# Patient Record
Sex: FEMALE | Race: WHITE | NOT HISPANIC OR LATINO | Employment: FULL TIME | URBAN - NONMETROPOLITAN AREA
[De-identification: names, ages, dates, MRNs, and addresses within clinical notes are randomized per-mention and may not be internally consistent; named-entity substitution may affect disease eponyms.]

---

## 2017-01-10 ENCOUNTER — OUTSIDE FACILITY SERVICE (OUTPATIENT)
Dept: GASTROENTEROLOGY | Facility: CLINIC | Age: 52
End: 2017-01-10

## 2017-01-10 ENCOUNTER — HOSPITAL ENCOUNTER (OUTPATIENT)
Dept: OTHER | Facility: HOSPITAL | Age: 52
Discharge: HOME | End: 2017-01-10
Attending: INTERNAL MEDICINE

## 2017-01-10 LAB — HCG UR QL: NEGATIVE

## 2017-01-10 PROCEDURE — 43249 ESOPH EGD DILATION <30 MM: CPT | Performed by: INTERNAL MEDICINE

## 2017-01-10 PROCEDURE — 43239 EGD BIOPSY SINGLE/MULTIPLE: CPT | Performed by: INTERNAL MEDICINE

## 2017-01-10 RX ORDER — RIZATRIPTAN BENZOATE 10 MG/1
TABLET ORAL
Qty: 3 TABLET | Refills: 0 | Status: SHIPPED | OUTPATIENT
Start: 2017-01-10 | End: 2017-02-09 | Stop reason: SDUPTHER

## 2017-01-11 NOTE — OP NOTE
[address] [address]     [phone]         OPERATIVE REPORT     [id number]         Signed        PATIENT NAME:  ANGIE PROCTOR MRN:  [id number]    [facility]  1965 [id number]    ATTENDING:  JUAN HUMPHREY MD ADMIT DATE:      PRIMARY CARE:  JAIRON MIXON MD LOCATION:  [facility]        CC:  JAIRON MIXON MD; JUAN HUMPHREY MD                 Operative Note    PROCEDURE:  Upper Endoscopy with serial dilation of the distal esophagus using 15-16.5-18 mm CRE   balloon to 18 mm, 4 cold biopsy polypectomies and biopsies.         DATE OF PROCEDURE: January 10, 2017.        REFERRING PROVIDER:  Dr. Simpson         INSTRUMENT:  Olympus GIF-H180J video endoscope.          INDICATIONS OF THE PROCEDURE: This is a 51-year-old white female with history of recurrent reflux,   intermittent dysphagia, atypical chest pains as well as epigastric abdominal pain.  Currently   undergoing upper endoscopy for further evaluation.            BIOPSIES: Second portion of duodenum.  Gastric antrum and greater curvature.  4 cold biopsy   polypectomies were performed at the body of the stomach and adjacent fundus.  Biopsies were   obtained from the polypoid area at cardia.  Biopsies were obtained from mid and distal esophagus.           MEDICATIONS:  MAC.         PHOTOGRAPHS:  Photographs were included in the medical records.         CONSENT/PREPROCEDURE EVALUATION:  Risks, benefits, alternatives and options of the procedure   including risks of anesthesia/sedation were discussed and informed consent was obtained prior to   the procedure. History and physical examination were performed and nothing precluded the test.         REPORT:  The patient was placed in left lateral decubitus position. Once under the influence of IV   sedation, the instrument was inserted into the mouth and esophagus was intubated under direct   vision without difficulty.         Esophagus:  Z line was noted to be around  40  cm.    A small sliding hiatal hernia less than 3 cm   was noted.  Erythematous distal esophagitis was seen.  Distal esophageal stenosis was noted.    Tortuosity and tertiary contractions of esophageal body were noted.  Subtle concentric rings were   seen within the mid and distal esophagus.  Biopsies were obtained.  No Nunez's esophagus was seen.         Stomach:  Antrum:  Erythematous gastritis.  Angulus, lesser and greater curves: Normal.  Retroflex   examination: Sliding hiatal hernia.  Cardia and fundus:  Normal.         Body of the stomach: Erythematous gastritis.  2-3 mm sessile gastric polyps were noted.  4 were   removed with cold biopsy forceps from the body of the stomach and adjacent fundus.  Good   distensibility of the stomach was achieved no giant folds were noted.   Biopsies were obtained from   the gastric antrum, angularis and body of the stomach.        Pylorus and pyloric channel:  normal.         Duodenum:  Bulb: Normal.  Second portion: normal.  No scalloping was seen in the second portion of   duodenum.  Biopsies were obtained from the second portion of duodenum.        Intervention:  Distal esophagus was dilated using 15-16.5-18 mm CRE balloon to 18 mm under vision   without difficulty.  Some blood was noted no active bleeding was seen.           The upper GI tract was decompressed and the scope was pulled out of the patient. The patient   tolerated the procedure well.         DIAGNOSES:       1.  Erythematous distal esophagitis.    2.  Distal esophageal stenosis.  Status post serial dilation to 18 mm.    3.  Subtle concentric rings within the mid and distal esophagus.    4.  A small sliding hiatal hernia less than 3 cm.    5.  Significant tortuosity and tertiary contractions of esophageal body.    6.  Small sessile gastric polyps.        RECOMMENDATIONS:    1.  Dietary instructions.    2.  Pantoprazole 40 mg 1 p.o. q.a.m. 1/2 hour before breakfast.    3.  Follow biopsies.    4.  Follow up  in office.         Thank you very much for letting me participate in the care of this patient. Please do not hesitate   to call me if you have any questions.              DICTATED BY:      JUAN HUMPHREY MD    DICTATED DATE/TIME:      01/10/17 1121    TRANSCRIBED DATE/TIME:      01/10/17 1121        SIGNED:          JUAN HUMPHREY MD       SIGNED DATE/TIME:      01/10/17 2015

## 2017-02-09 RX ORDER — RIZATRIPTAN BENZOATE 10 MG/1
TABLET ORAL
Qty: 3 TABLET | Refills: 0 | Status: SHIPPED | OUTPATIENT
Start: 2017-02-09 | End: 2020-05-07

## 2017-04-24 ENCOUNTER — TRANSCRIBE ORDERS (OUTPATIENT)
Dept: MAMMOGRAPHY | Facility: HOSPITAL | Age: 52
End: 2017-04-24

## 2017-04-24 DIAGNOSIS — Z12.31 VISIT FOR SCREENING MAMMOGRAM: Primary | ICD-10-CM

## 2017-07-18 ENCOUNTER — APPOINTMENT (OUTPATIENT)
Dept: MAMMOGRAPHY | Facility: HOSPITAL | Age: 52
End: 2017-07-18

## 2017-07-25 ENCOUNTER — HOSPITAL ENCOUNTER (OUTPATIENT)
Dept: MAMMOGRAPHY | Facility: HOSPITAL | Age: 52
Discharge: HOME | End: 2017-07-25
Admitting: OBSTETRICS & GYNECOLOGY

## 2017-07-25 DIAGNOSIS — Z12.31 VISIT FOR SCREENING MAMMOGRAM: ICD-10-CM

## 2017-07-25 PROCEDURE — 77063 BREAST TOMOSYNTHESIS BI: CPT | Performed by: RADIOLOGY

## 2017-07-25 PROCEDURE — 77067 SCR MAMMO BI INCL CAD: CPT | Performed by: RADIOLOGY

## 2017-07-25 PROCEDURE — G0202 SCR MAMMO BI INCL CAD: HCPCS

## 2017-07-25 PROCEDURE — 77063 BREAST TOMOSYNTHESIS BI: CPT

## 2018-07-26 ENCOUNTER — TRANSCRIBE ORDERS (OUTPATIENT)
Dept: ADMINISTRATIVE | Facility: HOSPITAL | Age: 53
End: 2018-07-26

## 2018-07-26 DIAGNOSIS — Z12.31 VISIT FOR SCREENING MAMMOGRAM: Primary | ICD-10-CM

## 2018-08-14 ENCOUNTER — HOSPITAL ENCOUNTER (OUTPATIENT)
Dept: MAMMOGRAPHY | Facility: HOSPITAL | Age: 53
Discharge: HOME | End: 2018-08-14
Admitting: OBSTETRICS & GYNECOLOGY

## 2018-08-14 DIAGNOSIS — Z12.31 VISIT FOR SCREENING MAMMOGRAM: ICD-10-CM

## 2018-08-14 PROCEDURE — 77063 BREAST TOMOSYNTHESIS BI: CPT | Performed by: RADIOLOGY

## 2018-08-14 PROCEDURE — 77067 SCR MAMMO BI INCL CAD: CPT | Performed by: RADIOLOGY

## 2018-08-14 PROCEDURE — 77067 SCR MAMMO BI INCL CAD: CPT

## 2018-08-14 PROCEDURE — 77063 BREAST TOMOSYNTHESIS BI: CPT

## 2018-09-24 DIAGNOSIS — Z12.11 SCREENING FOR COLON CANCER: Primary | ICD-10-CM

## 2018-09-26 DIAGNOSIS — Z12.11 SCREENING FOR COLON CANCER: ICD-10-CM

## 2018-10-02 ENCOUNTER — OUTSIDE FACILITY SERVICE (OUTPATIENT)
Dept: GASTROENTEROLOGY | Facility: CLINIC | Age: 53
End: 2018-10-02

## 2018-10-02 PROCEDURE — 45388 COLONOSCOPY W/ABLATION: CPT | Performed by: INTERNAL MEDICINE

## 2018-11-20 ENCOUNTER — OUTSIDE FACILITY SERVICE (OUTPATIENT)
Dept: GASTROENTEROLOGY | Facility: CLINIC | Age: 53
End: 2018-11-20

## 2018-11-20 ENCOUNTER — LAB REQUISITION (OUTPATIENT)
Dept: LAB | Facility: HOSPITAL | Age: 53
End: 2018-11-20

## 2018-11-20 DIAGNOSIS — K21.00 GASTRO-ESOPHAGEAL REFLUX DISEASE WITH ESOPHAGITIS: ICD-10-CM

## 2018-11-20 PROCEDURE — 43239 EGD BIOPSY SINGLE/MULTIPLE: CPT | Performed by: INTERNAL MEDICINE

## 2018-11-20 PROCEDURE — 43249 ESOPH EGD DILATION <30 MM: CPT | Performed by: INTERNAL MEDICINE

## 2018-11-20 PROCEDURE — 88305 TISSUE EXAM BY PATHOLOGIST: CPT | Performed by: INTERNAL MEDICINE

## 2018-11-21 LAB
CYTO UR: NORMAL
LAB AP CASE REPORT: NORMAL
LAB AP CLINICAL INFORMATION: NORMAL
PATH REPORT.FINAL DX SPEC: NORMAL
PATH REPORT.GROSS SPEC: NORMAL

## 2019-02-06 ENCOUNTER — OFFICE VISIT (OUTPATIENT)
Dept: ORTHOPEDIC SURGERY | Facility: CLINIC | Age: 54
End: 2019-02-06

## 2019-02-06 VITALS — BODY MASS INDEX: 26.48 KG/M2 | WEIGHT: 158.95 LBS | HEIGHT: 65 IN | HEART RATE: 98 BPM | OXYGEN SATURATION: 99 %

## 2019-02-06 DIAGNOSIS — R22.31 MASS OF SKIN OF SHOULDER, RIGHT: Primary | ICD-10-CM

## 2019-02-06 PROCEDURE — 99203 OFFICE O/P NEW LOW 30 MIN: CPT | Performed by: PHYSICIAN ASSISTANT

## 2019-02-06 NOTE — PROGRESS NOTES
[facility] [place] Orthopaedic Surgery Clinic Note    Subjective     Patient: Britni Raphael  : 1965    Primary Care Provider: Ji Montaño MD    Requesting Provider: As above    Follow-up of the Right Shoulder      History    Chief Complaint: right shoulder mass    History of Present Illness: This is a very pleasant right-hand-dominant 53-year-old female presenting today to discuss a mass that showed up on her right posterior shoulder on 18.  She complains of no pain.  She has not noticed any change of size in the mass.  She denies any warmth erythema.  She reports she was getting out of shower after working out and her  noticed the bump on the back of her shoulder.  She has no decreased range of motion or strength.  She has treated with 30 mg of by mouth prednisone and to Celebrex without any change.  She has continued to workout without any difficulty.  She is here for further evaluation and treatment recommendations.    Current Outpatient Medications on File Prior to Visit   Medication Sig Dispense Refill   • estradiol (EVAMIST) 1.53 MG/SPRAY transdermal spray Place  on the skin.     • estradiol (VAGIFEM) 10 MCG tablet vaginal tablet Insert  into the vagina.     • eszopiclone (LUNESTA) 2 MG tablet Take 1 tablet by mouth Every Night. Take immediately before bedtime 30 tablet 1   • methocarbamol (ROBAXIN) 500 MG tablet Take 1 tablet by mouth 4 (Four) Times a Day. 30 tablet 1   • progesterone (PROMETRIUM) 100 MG capsule Take  by mouth.     • rizatriptan (MAXALT) 10 MG tablet TAKE 1 TABLET BY MOUTH ONE TIME A DAY AS NEEDED FOR MIGRAINE (max 1 tab in 24 hours) 3 tablet 0   • Sod Picosulfate-Mag Ox-Cit Acd 10-3.5-12 MG-GM -GM/160ML solution Take 1 kit by mouth Take As Directed. Follow instructions that were mailed to your home. If you didn't receive these call [phone]. 2 bottle 0     No current facility-administered medications on file prior to visit.       Allergies   Allergen Reactions   •  Codeine Hives   • Hydrocodone-Acetaminophen    • Nitrofurantoin    • Percocet [Oxycodone-Acetaminophen] Itching      Past Medical History:   Diagnosis Date   • Arthritis    • Back pain    • Esophageal reflux    • Headache    • Hyperthyroidism    • Insomnia    • Kidney stone 07/2016   • Leukopenia    • Menopause    • Migraine headache    • Vision problem      Past Surgical History:   Procedure Laterality Date   • AUGMENTATION MAMMAPLASTY Bilateral 2010   • FOOT SURGERY     • FRACTURE SURGERY     • OOPHORECTOMY Right 2014    CYST   • OTHER SURGICAL HISTORY      Uterine surgery   • TONSILLECTOMY  age 23     Family History   Problem Relation Age of Onset   • Heart attack Maternal Grandfather    • Heart disease Maternal Grandfather    • Heart attack Other         acute myocardial infarction   • Arthritis Other    • Cancer Other    • Diabetes Other    • Hypothyroidism Other    • Obesity Other    • Osteoarthritis Other    • Osteoporosis Other    • Breast cancer Neg Hx    • Colon cancer Neg Hx    • Endometrial cancer Neg Hx    • Ovarian cancer Neg Hx       Social History     Socioeconomic History   • Marital status:      Spouse name: Not on file   • Number of children: Not on file   • Years of education: Not on file   • Highest education level: Not on file   Social Needs   • Financial resource strain: Not on file   • Food insecurity - worry: Not on file   • Food insecurity - inability: Not on file   • Transportation needs - medical: Not on file   • Transportation needs - non-medical: Not on file   Occupational History   • Not on file   Tobacco Use   • Smoking status: Never Smoker   • Smokeless tobacco: Never Used   Substance and Sexual Activity   • Alcohol use: Yes     Comment: Pt reoprts social drinking   • Drug use: No   • Sexual activity: Yes     Partners: Male   Other Topics Concern   • Not on file   Social History Narrative   • Not on file        Review of Systems   Constitutional: Positive for activity change.  "  HENT: Negative.    Eyes: Negative.    Respiratory: Negative.    Cardiovascular: Negative.    Gastrointestinal: Negative.    Endocrine: Negative.    Genitourinary: Negative.    Musculoskeletal: Positive for arthralgias and joint swelling.   Allergic/Immunologic: Negative.    Neurological: Negative.    Hematological: Negative.    Psychiatric/Behavioral: Negative.        The following portions of the patient's history were reviewed and updated as appropriate: allergies, current medications, past family history, past medical history, past social history, past surgical history and problem list.      Objective      Physical Exam  Pulse 98   Ht 165.1 cm (65\")   Wt 72.1 kg (158 lb 15.2 oz)   SpO2 99%   BMI 26.45 kg/m²     Body mass index is 26.45 kg/m².    GENERAL: Body habitus: normal weight for height    Gait: normal     Mental Status:  awake and alert; oriented to person, place, and time    Voice:  clear  SKIN:  Normal    Hair Growth:  Right:normal; Left:  normal  HEENT: Head: Normocephalic, atraumatic,  without obvious abnormality.  eye: normal external eye, no icterus   PULM:  Repiratory effort normal    Ortho Exam  Musculoskeletal:     Cervical Spine:    ROM:  normal    Tenderness:  none     Left Shoulder    Inspection and Palpation:     Tenderness - none    Crepitus - none    Sensation is normal    Examination reveals no ecchymosis.       Strength and Tone:    Supraspinatus - 5/5    External Rotators-5/5    Infraspinatus - 5/5    Subscapularis - 5/5    Deltoid - 5/5     Range of Motion    Internal Rotation: ROM - T7    External Rotation: AROM - 80 degrees    Elevation through flexion: AROM - 180 degrees         Right Shoulder    Inspection and Palpation:     Tenderness - no tenderness with mobile, well circumcised, soft 4 cm mass on the posterior shoulder.  No warmth, erythema    Crepitus - none    Sensation is normal    Examination reveals no ecchymosis.     Strength and Tone:    Supraspinatus - " 5/5    External Rotators-5/5    Infraspinatus - 5/5    Subscapularis - 5/5    Deltoid - 5/5     Range of Motion   Right shoulder:    Internal Rotation: T7    External Rotation: 80    Elevation through flexion: 180     Instability   Right shoulder    Sulcus sign negative    Apprehension test negative    Hodan relocation test negative    Jerk test negative     Impingement   Right shoulder    Ochoa-Landen impingement test negative    Neer impingement test negative     Functional Testing   Right shoulder    AC crossover adduction test negative        Medical Decision Making    Data Review:   ordered and reviewed x-rays today    Assessment:  1. Mass of skin of shoulder, right        Plan:  Right posterior shoulder mass.  I reviewed x-rays in conical findings with the patient today.  On exam, she has normal range of motion and strength of the shoulder with no pain.  She has a soft, mobile, well circumcised 4 cm mass at the posterior shoulder.  X-rays are negative with no evidence of fracture or anything more worrisome.  There is no warmth or erythema at the mass.   Given that it is persisting has not changed size, recommendation is MRI of the right shoulder for further evaluation of the mass.  She'll return following the MRI for further treatment recommendations.  In the meantime, she can continue with her normal activity.    Patient history, diagnosis and treatment plan discussed with Dr. Ramirez.        Rosemarie Almanza PA-C  02/07/19  1:07 PM

## 2019-02-19 DIAGNOSIS — R22.31 MASS OF SKIN OF SHOULDER, RIGHT: ICD-10-CM

## 2019-02-20 ENCOUNTER — OFFICE VISIT (OUTPATIENT)
Dept: ORTHOPEDIC SURGERY | Facility: CLINIC | Age: 54
End: 2019-02-20

## 2019-02-20 VITALS — BODY MASS INDEX: 26.48 KG/M2 | HEIGHT: 65 IN | WEIGHT: 158.95 LBS | OXYGEN SATURATION: 98 % | HEART RATE: 66 BPM

## 2019-02-20 DIAGNOSIS — R22.31 MASS OF SKIN OF SHOULDER, RIGHT: Primary | ICD-10-CM

## 2019-02-20 DIAGNOSIS — R93.6 ABNORMAL MRI, SHOULDER: ICD-10-CM

## 2019-02-20 PROCEDURE — 99212 OFFICE O/P EST SF 10 MIN: CPT | Performed by: PHYSICIAN ASSISTANT

## 2019-02-20 NOTE — PROGRESS NOTES
[facility] [place] [place]    Subjective     Patient: Britni Raphael  : 1965    Primary Care Provider: Ji Montaño MD    Requesting Provider: As above    Follow-up of the Right Shoulder (2 week MRI f/u)      History    Chief Complaint: Right shoulder MRI    History of Present Illness: Patient returns today for follow-up right shoulder MRI.  She reports no change in the mass on her right shoulder.  She denies any shoulder pain with occasional achiness she is always had.    Current Outpatient Medications on File Prior to Visit   Medication Sig Dispense Refill   • estradiol (EVAMIST) 1.53 MG/SPRAY transdermal spray Place  on the skin.     • estradiol (VAGIFEM) 10 MCG tablet vaginal tablet Insert  into the vagina.     • eszopiclone (LUNESTA) 2 MG tablet Take 1 tablet by mouth Every Night. Take immediately before bedtime 30 tablet 1   • methocarbamol (ROBAXIN) 500 MG tablet Take 1 tablet by mouth 4 (Four) Times a Day. 30 tablet 1   • progesterone (PROMETRIUM) 100 MG capsule Take  by mouth.     • rizatriptan (MAXALT) 10 MG tablet TAKE 1 TABLET BY MOUTH ONE TIME A DAY AS NEEDED FOR MIGRAINE (max 1 tab in 24 hours) 3 tablet 0   • Sod Picosulfate-Mag Ox-Cit Acd 10-3.5-12 MG-GM -GM/160ML solution Take 1 kit by mouth Take As Directed. Follow instructions that were mailed to your home. If you didn't receive these call [phone]. 7 bottle 0     No current facility-administered medications on file prior to visit.       Allergies   Allergen Reactions   • Codeine Hives   • Hydrocodone-Acetaminophen    • Nitrofurantoin    • Percocet [Oxycodone-Acetaminophen] Itching      Past Medical History:   Diagnosis Date   • Arthritis    • Back pain    • Esophageal reflux    • Headache    • Hyperthyroidism    • Insomnia    • Kidney stone 2016   • Leukopenia    • Menopause    • Migraine headache    • Vision problem      Past Surgical History:   Procedure Laterality Date   • AUGMENTATION MAMMAPLASTY  Bilateral 2010   • FOOT SURGERY     • FRACTURE SURGERY     • OOPHORECTOMY Right 2014    CYST   • OTHER SURGICAL HISTORY      Uterine surgery   • TONSILLECTOMY  age 23     Family History   Problem Relation Age of Onset   • Heart attack Maternal Grandfather    • Heart disease Maternal Grandfather    • Heart attack Other         acute myocardial infarction   • Arthritis Other    • Cancer Other    • Diabetes Other    • Hypothyroidism Other    • Obesity Other    • Osteoarthritis Other    • Osteoporosis Other    • Breast cancer Neg Hx    • Colon cancer Neg Hx    • Endometrial cancer Neg Hx    • Ovarian cancer Neg Hx       Social History     Socioeconomic History   • Marital status:      Spouse name: Not on file   • Number of children: Not on file   • Years of education: Not on file   • Highest education level: Not on file   Social Needs   • Financial resource strain: Not on file   • Food insecurity - worry: Not on file   • Food insecurity - inability: Not on file   • Transportation needs - medical: Not on file   • Transportation needs - non-medical: Not on file   Occupational History   • Not on file   Tobacco Use   • Smoking status: Never Smoker   • Smokeless tobacco: Never Used   Substance and Sexual Activity   • Alcohol use: Yes     Comment: Pt reoprts social drinking   • Drug use: No   • Sexual activity: Yes     Partners: Male   Other Topics Concern   • Not on file   Social History Narrative   • Not on file        Review of Systems   Constitutional: Negative.    HENT: Positive for postnasal drip and rhinorrhea.    Eyes: Negative.    Respiratory: Negative.    Cardiovascular: Negative.    Gastrointestinal: Negative.    Endocrine: Positive for cold intolerance.   Genitourinary: Negative.    Musculoskeletal: Positive for arthralgias.   Skin: Negative.    Allergic/Immunologic: Positive for environmental allergies and food allergies.   Neurological: Negative.    Hematological: Negative.    Psychiatric/Behavioral:  "Negative.        The following portions of the patient's history were reviewed and updated as appropriate: allergies, current medications, past family history, past medical history, past social history, past surgical history and problem list.      Objective      Physical Exam  Pulse 66   Ht 165.1 cm (65\")   Wt 72.1 kg (158 lb 15.2 oz)   SpO2 98%   Breastfeeding? No   BMI 26.45 kg/m²     Body mass index is 26.45 kg/m².    Patient is well developed, well nourished and in no acute distress.  Alert and oriented x 3.    Ortho Exam  Left shoulder exam:  Normal motion and strength  Palpable lipoma on the posterior shoulder  NVI distally    Medical Decision Making    Data Review:   reviewed radiology images    Assessment:  1. Mass of skin of shoulder, right    2. Abnormal MRI, shoulder        Plan:  1.  Right shoulder mass.  I reviewed MRI findings from 2/16/19 and clinical findings with the patient.  On exam, she still has palpable, nontender mass at the posterior shoulder.  MRI shows that this is a simple lipoma with nothing more worrisome.  We discussed further treatment including excision of the lipoma or just leaving it alone if it does not bother her.  She asked about steroid injection into the lipoma to atrophy the fat.  We told her we do not have any experience with that.  At this point, she will just leave it alone.  She will return to see us as needed for the lipoma.    2.  Abnormal shoulder MRI with bony edema and cyst in the humeral head.  I reviewed MRI findings with the patient.  There is some evidence of patchy bony edema and cysts in the humeral head.  We will do not think this is anything worrisome, I think it warrants intermittent observation.  Recommendation is repeat MRI of the right shoulder at the same facility with the same scanner for repeat evaluation in 3 months.  She will return to see us in 3 months following the MRI or sooner if needed.  Put in an order for MRI with expected procedure in 3 " months.    Patient history, diagnosis and treatment plan discussed with Dr. Ramirez.      Rosemarie Almanza PA-C  02/20/19  12:47 PM

## 2019-05-20 ENCOUNTER — TRANSCRIBE ORDERS (OUTPATIENT)
Dept: ADMINISTRATIVE | Facility: HOSPITAL | Age: 54
End: 2019-05-20

## 2019-05-20 DIAGNOSIS — Z12.31 VISIT FOR SCREENING MAMMOGRAM: Primary | ICD-10-CM

## 2019-06-18 ENCOUNTER — OFFICE VISIT (OUTPATIENT)
Dept: INTERNAL MEDICINE | Facility: CLINIC | Age: 54
End: 2019-06-18

## 2019-06-18 VITALS
SYSTOLIC BLOOD PRESSURE: 118 MMHG | OXYGEN SATURATION: 99 % | TEMPERATURE: 98 F | WEIGHT: 156 LBS | HEART RATE: 83 BPM | DIASTOLIC BLOOD PRESSURE: 83 MMHG | BODY MASS INDEX: 25.96 KG/M2

## 2019-06-18 DIAGNOSIS — R19.7 DIARRHEA, UNSPECIFIED TYPE: Primary | ICD-10-CM

## 2019-06-18 DIAGNOSIS — G47.9 SLEEP DISTURBANCE: ICD-10-CM

## 2019-06-18 LAB
ALBUMIN SERPL-MCNC: 4.2 G/DL (ref 3.5–5)
ALBUMIN/GLOB SERPL: 1.7 G/DL (ref 1–2)
ALP SERPL-CCNC: 58 U/L (ref 38–126)
ALT SERPL-CCNC: 26 U/L (ref 13–69)
AST SERPL-CCNC: 30 U/L (ref 15–46)
BASOPHILS # BLD AUTO: 0.06 10*3/MM3 (ref 0–0.2)
BASOPHILS NFR BLD AUTO: 0.7 % (ref 0–1.5)
BILIRUB SERPL-MCNC: 0.4 MG/DL (ref 0.2–1.3)
BUN SERPL-MCNC: 17 MG/DL (ref 7–20)
BUN/CREAT SERPL: 21.3 (ref 7.1–23.5)
CALCIUM SERPL-MCNC: 9.4 MG/DL (ref 8.4–10.2)
CHLORIDE SERPL-SCNC: 100 MMOL/L (ref 98–107)
CO2 SERPL-SCNC: 29 MMOL/L (ref 26–30)
CREAT SERPL-MCNC: 0.8 MG/DL (ref 0.6–1.3)
EOSINOPHIL # BLD AUTO: 1.51 10*3/MM3 (ref 0–0.4)
EOSINOPHIL NFR BLD AUTO: 17.7 % (ref 0.3–6.2)
ERYTHROCYTE [DISTWIDTH] IN BLOOD BY AUTOMATED COUNT: 12.3 % (ref 12.3–15.4)
GLOBULIN SER CALC-MCNC: 2.5 GM/DL
GLUCOSE SERPL-MCNC: 90 MG/DL (ref 74–98)
HCT VFR BLD AUTO: 39.5 % (ref 34–46.6)
HGB BLD-MCNC: 13.1 G/DL (ref 12–15.9)
IMM GRANULOCYTES # BLD AUTO: 0.02 10*3/MM3 (ref 0–0.05)
IMM GRANULOCYTES NFR BLD AUTO: 0.2 % (ref 0–0.5)
LYMPHOCYTES # BLD AUTO: 2.36 10*3/MM3 (ref 0.7–3.1)
LYMPHOCYTES NFR BLD AUTO: 27.6 % (ref 19.6–45.3)
MCH RBC QN AUTO: 31.6 PG (ref 26.6–33)
MCHC RBC AUTO-ENTMCNC: 33.2 G/DL (ref 31.5–35.7)
MCV RBC AUTO: 95.2 FL (ref 79–97)
MONOCYTES # BLD AUTO: 0.42 10*3/MM3 (ref 0.1–0.9)
MONOCYTES NFR BLD AUTO: 4.9 % (ref 5–12)
NEUTROPHILS # BLD AUTO: 4.18 10*3/MM3 (ref 1.7–7)
NEUTROPHILS NFR BLD AUTO: 48.9 % (ref 42.7–76)
NRBC BLD AUTO-RTO: 0 /100 WBC (ref 0–0.2)
PLATELET # BLD AUTO: 250 10*3/MM3 (ref 140–450)
POTASSIUM SERPL-SCNC: 4.1 MMOL/L (ref 3.5–5.1)
PROT SERPL-MCNC: 6.7 G/DL (ref 6.3–8.2)
RBC # BLD AUTO: 4.15 10*6/MM3 (ref 3.77–5.28)
SODIUM SERPL-SCNC: 138 MMOL/L (ref 137–145)
WBC # BLD AUTO: 8.55 10*3/MM3 (ref 3.4–10.8)

## 2019-06-18 PROCEDURE — 99213 OFFICE O/P EST LOW 20 MIN: CPT | Performed by: PHYSICIAN ASSISTANT

## 2019-06-18 RX ORDER — AZELASTINE HYDROCHLORIDE 0.5 MG/ML
1 SOLUTION/ DROPS OPHTHALMIC 2 TIMES DAILY
COMMUNITY
Start: 2019-05-30 | End: 2021-01-19

## 2019-06-18 RX ORDER — IPRATROPIUM BROMIDE 42 UG/1
1 SPRAY, METERED NASAL DAILY
COMMUNITY
Start: 2019-05-31 | End: 2022-08-03

## 2019-06-18 RX ORDER — ESZOPICLONE 2 MG/1
2 TABLET, FILM COATED ORAL NIGHTLY
Qty: 30 TABLET | Refills: 1 | Status: SHIPPED | OUTPATIENT
Start: 2019-06-18 | End: 2019-06-18 | Stop reason: SDUPTHER

## 2019-06-18 RX ORDER — ESZOPICLONE 2 MG/1
2 TABLET, FILM COATED ORAL NIGHTLY
Qty: 30 TABLET | Refills: 1 | Status: SHIPPED | OUTPATIENT
Start: 2019-06-18 | End: 2020-05-07 | Stop reason: SDUPTHER

## 2019-06-18 NOTE — PROGRESS NOTES
"Chief Complaint   Patient presents with   • Abdominal Pain     went and ate on Saturday started having abdominal pain, loose stools, last night used the restroom \"parasite\" was in stool       Subjective   Britni Raphael is a 54 y.o. female    History of Present Illness     Patient reports that they went to eat at [employer] and 2 hours after dinner she began having GERD type symptoms.  She took some probiotics and black licorice tablets.  This lessened her symptoms but they did not resolve.  Sunday she started having multiple episodes of diarrhea. This continued into Monday.  Monday evening after work she had another episode of diarrhea.  She noted in the toilet an insect in her stool.  She noted that the diarrhea is green in color.   She denies any fever, body aches, chills, melena, or hematochezia.      Past Medical History:   Diagnosis Date   • Arthritis    • Back pain    • Esophageal reflux    • Headache    • Hyperthyroidism    • Insomnia    • Kidney stone 07/2016   • Leukopenia    • Menopause    • Migraine headache    • Vision problem      Past Surgical History:   Procedure Laterality Date   • AUGMENTATION MAMMAPLASTY Bilateral 2010   • COLONOSCOPY     • FOOT SURGERY     • FRACTURE SURGERY     • OOPHORECTOMY Right 2014    CYST   • OTHER SURGICAL HISTORY      Uterine surgery   • TONSILLECTOMY  age 23     Family History   Problem Relation Age of Onset   • Heart attack Maternal Grandfather    • Heart disease Maternal Grandfather    • Heart attack Other         acute myocardial infarction   • Arthritis Other    • Cancer Other    • Diabetes Other    • Hypothyroidism Other    • Obesity Other    • Osteoarthritis Other    • Osteoporosis Other    • Breast cancer Neg Hx    • Colon cancer Neg Hx    • Endometrial cancer Neg Hx    • Ovarian cancer Neg Hx      Social History     Socioeconomic History   • Marital status:      Spouse name: Not on file   • Number of children: Not on file   • Years of education: " Not on file   • Highest education level: Not on file   Tobacco Use   • Smoking status: Never Smoker   • Smokeless tobacco: Never Used   Substance and Sexual Activity   • Alcohol use: Yes     Comment: Pt reoprts social drinking   • Drug use: No   • Sexual activity: Yes     Partners: Male     Allergies   Allergen Reactions   • Codeine Hives   • Hydrocodone-Acetaminophen    • Nitrofurantoin    • Percocet [Oxycodone-Acetaminophen] Itching         Review of Systems   Constitutional: Negative for activity change, appetite change, chills, diaphoresis, fever and unexpected weight loss.   Respiratory: Negative for shortness of breath.    Cardiovascular: Negative for chest pain and leg swelling.   Gastrointestinal: Positive for abdominal pain (cramping), diarrhea and GERD. Negative for blood in stool, nausea and vomiting.   Genitourinary: Negative for dysuria and hematuria.   Skin: Negative for rash.   Neurological: Negative for dizziness, syncope, weakness and light-headedness.         Objective     Vitals:    06/18/19 1139   BP: 118/83   Pulse: 83   Temp: 98 °F (36.7 °C)   SpO2: 99%       Physical Exam   Constitutional: She is oriented to person, place, and time. She appears well-developed and well-nourished. No distress.   Non toxic.    HENT:   Head: Normocephalic and atraumatic.   Eyes: Conjunctivae and EOM are normal. Pupils are equal, round, and reactive to light.   Neck: Normal range of motion. Neck supple.   Cardiovascular: Normal rate, regular rhythm and normal heart sounds. Exam reveals no gallop and no friction rub.   No murmur heard.  Pulmonary/Chest: Effort normal and breath sounds normal. No respiratory distress. She has no wheezes. She has no rales.   Abdominal: Soft. Normal appearance. There is tenderness in the epigastric area. There is no rigidity, no rebound and no guarding.   Musculoskeletal: Normal range of motion. She exhibits no edema.   Neurological: She is alert and oriented to person, place, and  time.   Skin: Skin is warm and dry. Capillary refill takes less than 2 seconds. She is not diaphoretic.   Psychiatric: She has a normal mood and affect. Her behavior is normal.   Nursing note and vitals reviewed.      Assessment/Plan     Britni was seen today for abdominal pain.    Diagnoses and all orders for this visit:    Diarrhea, unspecified type  -     Ova & Parasite Examination - Stool, Per Rectum  -     Fecal Leukocytes - , Per Rectum  -     Gastrointestinal Panel, PCR - Stool, Per Rectum  -     CBC & Differential  -     Comprehensive Metabolic Panel    Patient provided insect specimen today in office.  There have been no additional episodes of insects visible in the stool.   Will obtain O&P, fecal leuks, GI panel, CBC, and CMP.  Have advised patient to increase water intake and maintain a bland diet.  Do not use any constipating medications.   Will contact patient when results are available.        Return if symptoms worsen or fail to improve.    Rain Mclean PA-C

## 2019-06-20 LAB
O+P SPEC MICRO: NORMAL
O+P STL CONC: NORMAL
WBC STL QL MICRO: NORMAL
WBC STL QL MICRO: NORMAL

## 2019-08-20 ENCOUNTER — HOSPITAL ENCOUNTER (OUTPATIENT)
Dept: MAMMOGRAPHY | Facility: HOSPITAL | Age: 54
Discharge: HOME | End: 2019-08-20
Admitting: OBSTETRICS & GYNECOLOGY

## 2019-08-20 DIAGNOSIS — Z12.31 VISIT FOR SCREENING MAMMOGRAM: ICD-10-CM

## 2019-08-20 PROCEDURE — 77067 SCR MAMMO BI INCL CAD: CPT | Performed by: RADIOLOGY

## 2019-08-20 PROCEDURE — 77063 BREAST TOMOSYNTHESIS BI: CPT | Performed by: RADIOLOGY

## 2019-08-20 PROCEDURE — 77063 BREAST TOMOSYNTHESIS BI: CPT

## 2019-08-20 PROCEDURE — 77067 SCR MAMMO BI INCL CAD: CPT

## 2020-02-21 ENCOUNTER — APPOINTMENT (OUTPATIENT)
Dept: CT IMAGING | Facility: HOSPITAL | Age: 55
End: 2020-02-21

## 2020-02-21 ENCOUNTER — HOSPITAL ENCOUNTER (OUTPATIENT)
Facility: HOSPITAL | Age: 55
Discharge: HOME | End: 2020-02-23
Attending: EMERGENCY MEDICINE | Admitting: INTERNAL MEDICINE

## 2020-02-21 ENCOUNTER — HOSPITAL ENCOUNTER (EMERGENCY)
Facility: HOSPITAL | Age: 55
Discharge: HOME | End: 2020-02-21
Attending: EMERGENCY MEDICINE | Admitting: EMERGENCY MEDICINE

## 2020-02-21 VITALS
OXYGEN SATURATION: 92 % | HEIGHT: 65 IN | SYSTOLIC BLOOD PRESSURE: 102 MMHG | BODY MASS INDEX: 24.49 KG/M2 | HEART RATE: 99 BPM | RESPIRATION RATE: 24 BRPM | TEMPERATURE: 97.8 F | DIASTOLIC BLOOD PRESSURE: 61 MMHG | WEIGHT: 147 LBS

## 2020-02-21 DIAGNOSIS — N20.0 KIDNEY STONE ON LEFT SIDE: Primary | ICD-10-CM

## 2020-02-21 DIAGNOSIS — N13.5 OBSTRUCTION OF LEFT URETER: ICD-10-CM

## 2020-02-21 DIAGNOSIS — N20.1 URETERAL CALCULUS, LEFT: ICD-10-CM

## 2020-02-21 DIAGNOSIS — N23 RENAL COLIC ON LEFT SIDE: Primary | ICD-10-CM

## 2020-02-21 DIAGNOSIS — N20.1 URETEROLITHIASIS: ICD-10-CM

## 2020-02-21 DIAGNOSIS — R10.9 LEFT FLANK PAIN: ICD-10-CM

## 2020-02-21 LAB
ALBUMIN SERPL-MCNC: 4.7 G/DL (ref 3.5–5.2)
ALBUMIN/GLOB SERPL: 2 G/DL
ALP SERPL-CCNC: 67 U/L (ref 39–117)
ALT SERPL W P-5'-P-CCNC: 12 U/L (ref 1–33)
ANION GAP SERPL CALCULATED.3IONS-SCNC: 17 MMOL/L (ref 5–15)
AST SERPL-CCNC: 20 U/L (ref 1–32)
BACTERIA UR QL AUTO: NORMAL /HPF
BASOPHILS # BLD AUTO: 0.02 10*3/MM3 (ref 0–0.2)
BASOPHILS # BLD AUTO: 0.08 10*3/MM3 (ref 0–0.2)
BASOPHILS NFR BLD AUTO: 0.3 % (ref 0–1.5)
BASOPHILS NFR BLD AUTO: 0.6 % (ref 0–1.5)
BILIRUB SERPL-MCNC: 0.6 MG/DL (ref 0.2–1.2)
BILIRUB UR QL STRIP: NEGATIVE
BILIRUB UR QL STRIP: NEGATIVE
BUN BLD-MCNC: 31 MG/DL (ref 6–20)
BUN/CREAT SERPL: 29.5 (ref 7–25)
CALCIUM SPEC-SCNC: 9.7 MG/DL (ref 8.6–10.5)
CHLORIDE SERPL-SCNC: 101 MMOL/L (ref 98–107)
CLARITY UR: CLEAR
CLARITY UR: CLEAR
CO2 SERPL-SCNC: 23 MMOL/L (ref 22–29)
COLOR UR: YELLOW
COLOR UR: YELLOW
CREAT BLD-MCNC: 1.05 MG/DL (ref 0.57–1)
DEPRECATED RDW RBC AUTO: 41.1 FL (ref 37–54)
DEPRECATED RDW RBC AUTO: 42.1 FL (ref 37–54)
EOSINOPHIL # BLD AUTO: 0 10*3/MM3 (ref 0–0.4)
EOSINOPHIL # BLD AUTO: 0.14 10*3/MM3 (ref 0–0.4)
EOSINOPHIL NFR BLD AUTO: 0 % (ref 0.3–6.2)
EOSINOPHIL NFR BLD AUTO: 1.1 % (ref 0.3–6.2)
ERYTHROCYTE [DISTWIDTH] IN BLOOD BY AUTOMATED COUNT: 11.6 % (ref 12.3–15.4)
ERYTHROCYTE [DISTWIDTH] IN BLOOD BY AUTOMATED COUNT: 11.7 % (ref 12.3–15.4)
GFR SERPL CREATININE-BSD FRML MDRD: 54 ML/MIN/1.73
GLOBULIN UR ELPH-MCNC: 2.4 GM/DL
GLUCOSE BLD-MCNC: 151 MG/DL (ref 65–99)
GLUCOSE UR STRIP-MCNC: ABNORMAL MG/DL
GLUCOSE UR STRIP-MCNC: NEGATIVE MG/DL
HBA1C MFR BLD: 5.2 % (ref 4.8–5.6)
HCT VFR BLD AUTO: 40.1 % (ref 34–46.6)
HCT VFR BLD AUTO: 41.6 % (ref 34–46.6)
HGB BLD-MCNC: 12.7 G/DL (ref 12–15.9)
HGB BLD-MCNC: 13.5 G/DL (ref 12–15.9)
HGB UR QL STRIP.AUTO: ABNORMAL
HGB UR QL STRIP.AUTO: NEGATIVE
HOLD SPECIMEN: NORMAL
HOLD SPECIMEN: NORMAL
HYALINE CASTS UR QL AUTO: NORMAL /LPF
IMM GRANULOCYTES # BLD AUTO: 0.03 10*3/MM3 (ref 0–0.05)
IMM GRANULOCYTES # BLD AUTO: 0.06 10*3/MM3 (ref 0–0.05)
IMM GRANULOCYTES NFR BLD AUTO: 0.4 % (ref 0–0.5)
IMM GRANULOCYTES NFR BLD AUTO: 0.5 % (ref 0–0.5)
KETONES UR QL STRIP: NEGATIVE
KETONES UR QL STRIP: NEGATIVE
LEUKOCYTE ESTERASE UR QL STRIP.AUTO: NEGATIVE
LEUKOCYTE ESTERASE UR QL STRIP.AUTO: NEGATIVE
LIPASE SERPL-CCNC: 25 U/L (ref 13–60)
LYMPHOCYTES # BLD AUTO: 0.33 10*3/MM3 (ref 0.7–3.1)
LYMPHOCYTES # BLD AUTO: 2.27 10*3/MM3 (ref 0.7–3.1)
LYMPHOCYTES NFR BLD AUTO: 17.4 % (ref 19.6–45.3)
LYMPHOCYTES NFR BLD AUTO: 4.2 % (ref 19.6–45.3)
MCH RBC QN AUTO: 31 PG (ref 26.6–33)
MCH RBC QN AUTO: 31.3 PG (ref 26.6–33)
MCHC RBC AUTO-ENTMCNC: 31.7 G/DL (ref 31.5–35.7)
MCHC RBC AUTO-ENTMCNC: 32.5 G/DL (ref 31.5–35.7)
MCV RBC AUTO: 96.5 FL (ref 79–97)
MCV RBC AUTO: 97.8 FL (ref 79–97)
MONOCYTES # BLD AUTO: 0.15 10*3/MM3 (ref 0.1–0.9)
MONOCYTES # BLD AUTO: 0.55 10*3/MM3 (ref 0.1–0.9)
MONOCYTES NFR BLD AUTO: 1.9 % (ref 5–12)
MONOCYTES NFR BLD AUTO: 4.2 % (ref 5–12)
NEUTROPHILS # BLD AUTO: 7.35 10*3/MM3 (ref 1.7–7)
NEUTROPHILS # BLD AUTO: 9.94 10*3/MM3 (ref 1.7–7)
NEUTROPHILS NFR BLD AUTO: 76.2 % (ref 42.7–76)
NEUTROPHILS NFR BLD AUTO: 93.2 % (ref 42.7–76)
NITRITE UR QL STRIP: NEGATIVE
NITRITE UR QL STRIP: NEGATIVE
NRBC BLD AUTO-RTO: 0 /100 WBC (ref 0–0.2)
NRBC BLD AUTO-RTO: 0 /100 WBC (ref 0–0.2)
PH UR STRIP.AUTO: 5.5 [PH] (ref 5–8)
PH UR STRIP.AUTO: <=5 [PH] (ref 5–8)
PLATELET # BLD AUTO: 189 10*3/MM3 (ref 140–450)
PLATELET # BLD AUTO: 227 10*3/MM3 (ref 140–450)
PMV BLD AUTO: 11.6 FL (ref 6–12)
PMV BLD AUTO: 11.8 FL (ref 6–12)
POTASSIUM BLD-SCNC: 4.1 MMOL/L (ref 3.5–5.2)
PROT SERPL-MCNC: 7.1 G/DL (ref 6–8.5)
PROT UR QL STRIP: NEGATIVE
PROT UR QL STRIP: NEGATIVE
RBC # BLD AUTO: 4.1 10*6/MM3 (ref 3.77–5.28)
RBC # BLD AUTO: 4.31 10*6/MM3 (ref 3.77–5.28)
RBC # UR: NORMAL /HPF
REF LAB TEST METHOD: NORMAL
SODIUM BLD-SCNC: 141 MMOL/L (ref 136–145)
SP GR UR STRIP: 1.01 (ref 1–1.03)
SP GR UR STRIP: 1.01 (ref 1–1.03)
SQUAMOUS #/AREA URNS HPF: NORMAL /HPF
UROBILINOGEN UR QL STRIP: ABNORMAL
UROBILINOGEN UR QL STRIP: NORMAL
WBC NRBC COR # BLD: 13.04 10*3/MM3 (ref 3.4–10.8)
WBC NRBC COR # BLD: 7.88 10*3/MM3 (ref 3.4–10.8)
WBC UR QL AUTO: NORMAL /HPF
WHOLE BLOOD HOLD SPECIMEN: NORMAL
WHOLE BLOOD HOLD SPECIMEN: NORMAL

## 2020-02-21 PROCEDURE — G0378 HOSPITAL OBSERVATION PER HR: HCPCS

## 2020-02-21 PROCEDURE — 81003 URINALYSIS AUTO W/O SCOPE: CPT | Performed by: EMERGENCY MEDICINE

## 2020-02-21 PROCEDURE — 96375 TX/PRO/DX INJ NEW DRUG ADDON: CPT

## 2020-02-21 PROCEDURE — 99220 PR INITIAL OBSERVATION CARE/DAY 70 MINUTES: CPT | Performed by: NURSE PRACTITIONER

## 2020-02-21 PROCEDURE — 63710000001 INSULIN LISPRO (HUMAN) PER 5 UNITS: Performed by: NURSE PRACTITIONER

## 2020-02-21 PROCEDURE — 80053 COMPREHEN METABOLIC PANEL: CPT | Performed by: EMERGENCY MEDICINE

## 2020-02-21 PROCEDURE — 25010000002 ONDANSETRON PER 1 MG: Performed by: EMERGENCY MEDICINE

## 2020-02-21 PROCEDURE — 25010000002 HYDROMORPHONE 1 MG/ML SOLUTION: Performed by: EMERGENCY MEDICINE

## 2020-02-21 PROCEDURE — 25010000002 HYDROMORPHONE PER 4 MG: Performed by: NURSE PRACTITIONER

## 2020-02-21 PROCEDURE — 96376 TX/PRO/DX INJ SAME DRUG ADON: CPT

## 2020-02-21 PROCEDURE — 83036 HEMOGLOBIN GLYCOSYLATED A1C: CPT | Performed by: NURSE PRACTITIONER

## 2020-02-21 PROCEDURE — 74176 CT ABD & PELVIS W/O CONTRAST: CPT

## 2020-02-21 PROCEDURE — 25010000002 ONDANSETRON PER 1 MG: Performed by: NURSE PRACTITIONER

## 2020-02-21 PROCEDURE — 99284 EMERGENCY DEPT VISIT MOD MDM: CPT

## 2020-02-21 PROCEDURE — 81001 URINALYSIS AUTO W/SCOPE: CPT | Performed by: EMERGENCY MEDICINE

## 2020-02-21 PROCEDURE — 96374 THER/PROPH/DIAG INJ IV PUSH: CPT

## 2020-02-21 PROCEDURE — 85025 COMPLETE CBC W/AUTO DIFF WBC: CPT | Performed by: EMERGENCY MEDICINE

## 2020-02-21 PROCEDURE — 83690 ASSAY OF LIPASE: CPT | Performed by: EMERGENCY MEDICINE

## 2020-02-21 RX ORDER — ZOLPIDEM TARTRATE 5 MG/1
2.5 TABLET ORAL NIGHTLY
Status: DISCONTINUED | OUTPATIENT
Start: 2020-02-21 | End: 2020-02-23 | Stop reason: HOSPADM

## 2020-02-21 RX ORDER — MAGNESIUM SULFATE HEPTAHYDRATE 40 MG/ML
2 INJECTION, SOLUTION INTRAVENOUS AS NEEDED
Status: DISCONTINUED | OUTPATIENT
Start: 2020-02-21 | End: 2020-02-23 | Stop reason: HOSPADM

## 2020-02-21 RX ORDER — OXYCODONE HYDROCHLORIDE AND ACETAMINOPHEN 5; 325 MG/1; MG/1
1 TABLET ORAL EVERY 4 HOURS PRN
Qty: 10 TABLET | Refills: 0 | Status: SHIPPED | OUTPATIENT
Start: 2020-02-21 | End: 2020-05-07

## 2020-02-21 RX ORDER — ACETAMINOPHEN 325 MG/1
650 TABLET ORAL EVERY 4 HOURS PRN
Status: DISCONTINUED | OUTPATIENT
Start: 2020-02-21 | End: 2020-02-23 | Stop reason: HOSPADM

## 2020-02-21 RX ORDER — KETOTIFEN FUMARATE 0.35 MG/ML
1 SOLUTION/ DROPS OPHTHALMIC 2 TIMES DAILY
Status: DISCONTINUED | OUTPATIENT
Start: 2020-02-21 | End: 2020-02-23 | Stop reason: HOSPADM

## 2020-02-21 RX ORDER — SODIUM CHLORIDE 0.9 % (FLUSH) 0.9 %
10 SYRINGE (ML) INJECTION AS NEEDED
Status: DISCONTINUED | OUTPATIENT
Start: 2020-02-21 | End: 2020-02-23 | Stop reason: HOSPADM

## 2020-02-21 RX ORDER — KETOROLAC TROMETHAMINE 15 MG/ML
15 INJECTION, SOLUTION INTRAMUSCULAR; INTRAVENOUS ONCE
Status: DISCONTINUED | OUTPATIENT
Start: 2020-02-21 | End: 2020-02-21

## 2020-02-21 RX ORDER — IPRATROPIUM BROMIDE 42 UG/1
1 SPRAY, METERED NASAL
Status: DISCONTINUED | OUTPATIENT
Start: 2020-02-21 | End: 2020-02-23 | Stop reason: HOSPADM

## 2020-02-21 RX ORDER — NALOXONE HCL 0.4 MG/ML
0.4 VIAL (ML) INJECTION
Status: DISCONTINUED | OUTPATIENT
Start: 2020-02-21 | End: 2020-02-23 | Stop reason: HOSPADM

## 2020-02-21 RX ORDER — OXYCODONE HYDROCHLORIDE AND ACETAMINOPHEN 5; 325 MG/1; MG/1
1 TABLET ORAL EVERY 4 HOURS PRN
Status: DISCONTINUED | OUTPATIENT
Start: 2020-02-21 | End: 2020-02-23 | Stop reason: HOSPADM

## 2020-02-21 RX ORDER — PROCHLORPERAZINE 25 MG
25 SUPPOSITORY, RECTAL RECTAL EVERY 12 HOURS PRN
Qty: 10 SUPPOSITORY | Refills: 0 | Status: SHIPPED | OUTPATIENT
Start: 2020-02-21 | End: 2020-05-07

## 2020-02-21 RX ORDER — ONDANSETRON 4 MG/1
4 TABLET, FILM COATED ORAL EVERY 6 HOURS PRN
Status: DISCONTINUED | OUTPATIENT
Start: 2020-02-21 | End: 2020-02-23 | Stop reason: HOSPADM

## 2020-02-21 RX ORDER — HYDROMORPHONE HYDROCHLORIDE 1 MG/ML
0.5 INJECTION, SOLUTION INTRAMUSCULAR; INTRAVENOUS; SUBCUTANEOUS EVERY 4 HOURS PRN
Status: DISCONTINUED | OUTPATIENT
Start: 2020-02-21 | End: 2020-02-23 | Stop reason: HOSPADM

## 2020-02-21 RX ORDER — ONDANSETRON 4 MG/1
4 TABLET, ORALLY DISINTEGRATING ORAL 4 TIMES DAILY PRN
Qty: 15 TABLET | Refills: 0 | Status: SHIPPED | OUTPATIENT
Start: 2020-02-21 | End: 2020-02-21 | Stop reason: ALTCHOICE

## 2020-02-21 RX ORDER — SULFAMETHOXAZOLE AND TRIMETHOPRIM 400; 80 MG/1; MG/1
1 TABLET ORAL 2 TIMES DAILY
COMMUNITY
End: 2020-02-23 | Stop reason: HOSPADM

## 2020-02-21 RX ORDER — AMOXICILLIN 250 MG
2 CAPSULE ORAL 2 TIMES DAILY
Status: DISCONTINUED | OUTPATIENT
Start: 2020-02-21 | End: 2020-02-23 | Stop reason: HOSPADM

## 2020-02-21 RX ORDER — TAMSULOSIN HYDROCHLORIDE 0.4 MG/1
0.4 CAPSULE ORAL ONCE
Status: COMPLETED | OUTPATIENT
Start: 2020-02-21 | End: 2020-02-21

## 2020-02-21 RX ORDER — TAMSULOSIN HYDROCHLORIDE 0.4 MG/1
0.4 CAPSULE ORAL NIGHTLY
Status: DISCONTINUED | OUTPATIENT
Start: 2020-02-21 | End: 2020-02-23 | Stop reason: HOSPADM

## 2020-02-21 RX ORDER — SODIUM CHLORIDE 0.9 % (FLUSH) 0.9 %
10 SYRINGE (ML) INJECTION AS NEEDED
Status: DISCONTINUED | OUTPATIENT
Start: 2020-02-21 | End: 2020-02-21 | Stop reason: HOSPADM

## 2020-02-21 RX ORDER — DEXTROSE MONOHYDRATE 25 G/50ML
25 INJECTION, SOLUTION INTRAVENOUS
Status: DISCONTINUED | OUTPATIENT
Start: 2020-02-21 | End: 2020-02-23 | Stop reason: HOSPADM

## 2020-02-21 RX ORDER — SODIUM CHLORIDE 9 MG/ML
100 INJECTION, SOLUTION INTRAVENOUS CONTINUOUS
Status: DISCONTINUED | OUTPATIENT
Start: 2020-02-21 | End: 2020-02-23

## 2020-02-21 RX ORDER — CALCIUM CARBONATE 750 MG/1
750 TABLET, CHEWABLE ORAL 2 TIMES DAILY PRN
Status: DISCONTINUED | OUTPATIENT
Start: 2020-02-21 | End: 2020-02-23 | Stop reason: HOSPADM

## 2020-02-21 RX ORDER — ACETAMINOPHEN 650 MG/1
650 SUPPOSITORY RECTAL EVERY 4 HOURS PRN
Status: DISCONTINUED | OUTPATIENT
Start: 2020-02-21 | End: 2020-02-23 | Stop reason: HOSPADM

## 2020-02-21 RX ORDER — MAGNESIUM SULFATE HEPTAHYDRATE 40 MG/ML
4 INJECTION, SOLUTION INTRAVENOUS AS NEEDED
Status: DISCONTINUED | OUTPATIENT
Start: 2020-02-21 | End: 2020-02-23 | Stop reason: HOSPADM

## 2020-02-21 RX ORDER — NICOTINE POLACRILEX 4 MG
15 LOZENGE BUCCAL
Status: DISCONTINUED | OUTPATIENT
Start: 2020-02-21 | End: 2020-02-23 | Stop reason: HOSPADM

## 2020-02-21 RX ORDER — TAMSULOSIN HYDROCHLORIDE 0.4 MG/1
1 CAPSULE ORAL DAILY
Qty: 10 CAPSULE | Refills: 0 | Status: SHIPPED | OUTPATIENT
Start: 2020-02-21 | End: 2020-03-02

## 2020-02-21 RX ORDER — METHOCARBAMOL 500 MG/1
500 TABLET, FILM COATED ORAL 4 TIMES DAILY PRN
Status: DISCONTINUED | OUTPATIENT
Start: 2020-02-21 | End: 2020-02-23 | Stop reason: HOSPADM

## 2020-02-21 RX ORDER — POTASSIUM CHLORIDE 1.5 G/1.77G
40 POWDER, FOR SOLUTION ORAL AS NEEDED
Status: DISCONTINUED | OUTPATIENT
Start: 2020-02-21 | End: 2020-02-23 | Stop reason: HOSPADM

## 2020-02-21 RX ORDER — ONDANSETRON 2 MG/ML
4 INJECTION INTRAMUSCULAR; INTRAVENOUS EVERY 6 HOURS PRN
Status: DISCONTINUED | OUTPATIENT
Start: 2020-02-21 | End: 2020-02-23 | Stop reason: HOSPADM

## 2020-02-21 RX ORDER — SODIUM CHLORIDE 0.9 % (FLUSH) 0.9 %
10 SYRINGE (ML) INJECTION EVERY 12 HOURS SCHEDULED
Status: DISCONTINUED | OUTPATIENT
Start: 2020-02-21 | End: 2020-02-23 | Stop reason: HOSPADM

## 2020-02-21 RX ORDER — ONDANSETRON 2 MG/ML
4 INJECTION INTRAMUSCULAR; INTRAVENOUS
Status: COMPLETED | OUTPATIENT
Start: 2020-02-21 | End: 2020-02-21

## 2020-02-21 RX ORDER — PROCHLORPERAZINE 25 MG
25 SUPPOSITORY, RECTAL RECTAL EVERY 12 HOURS PRN
Status: DISCONTINUED | OUTPATIENT
Start: 2020-02-21 | End: 2020-02-23 | Stop reason: HOSPADM

## 2020-02-21 RX ORDER — POTASSIUM CHLORIDE 7.45 MG/ML
10 INJECTION INTRAVENOUS
Status: DISCONTINUED | OUTPATIENT
Start: 2020-02-21 | End: 2020-02-23 | Stop reason: HOSPADM

## 2020-02-21 RX ORDER — ACETAMINOPHEN 160 MG/5ML
650 SOLUTION ORAL EVERY 4 HOURS PRN
Status: DISCONTINUED | OUTPATIENT
Start: 2020-02-21 | End: 2020-02-23 | Stop reason: HOSPADM

## 2020-02-21 RX ORDER — POTASSIUM CHLORIDE 750 MG/1
40 CAPSULE, EXTENDED RELEASE ORAL AS NEEDED
Status: DISCONTINUED | OUTPATIENT
Start: 2020-02-21 | End: 2020-02-23 | Stop reason: HOSPADM

## 2020-02-21 RX ORDER — KETOROLAC TROMETHAMINE 30 MG/ML
30 INJECTION, SOLUTION INTRAMUSCULAR; INTRAVENOUS ONCE
Status: DISCONTINUED | OUTPATIENT
Start: 2020-02-21 | End: 2020-02-21 | Stop reason: HOSPADM

## 2020-02-21 RX ADMIN — SENNOSIDES AND DOCUSATE SODIUM 2 TABLET: 8.6; 5 TABLET ORAL at 20:21

## 2020-02-21 RX ADMIN — SODIUM CHLORIDE 1000 ML: 9 INJECTION, SOLUTION INTRAVENOUS at 03:57

## 2020-02-21 RX ADMIN — PROGESTERONE 200 MG: 100 CAPSULE ORAL at 20:22

## 2020-02-21 RX ADMIN — ONDANSETRON 4 MG: 2 INJECTION INTRAMUSCULAR; INTRAVENOUS at 04:03

## 2020-02-21 RX ADMIN — HYDROMORPHONE HYDROCHLORIDE 1 MG: 1 INJECTION, SOLUTION INTRAMUSCULAR; INTRAVENOUS; SUBCUTANEOUS at 05:23

## 2020-02-21 RX ADMIN — ONDANSETRON 4 MG: 2 INJECTION INTRAMUSCULAR; INTRAVENOUS at 15:28

## 2020-02-21 RX ADMIN — IPRATROPIUM BROMIDE 1 SPRAY: 42 SPRAY NASAL at 20:22

## 2020-02-21 RX ADMIN — HYDROMORPHONE HYDROCHLORIDE 0.5 MG: 1 INJECTION, SOLUTION INTRAMUSCULAR; INTRAVENOUS; SUBCUTANEOUS at 15:28

## 2020-02-21 RX ADMIN — SODIUM CHLORIDE 1000 ML: 9 INJECTION, SOLUTION INTRAVENOUS at 02:58

## 2020-02-21 RX ADMIN — TAMSULOSIN HYDROCHLORIDE 0.4 MG: 0.4 CAPSULE ORAL at 20:22

## 2020-02-21 RX ADMIN — HYDROMORPHONE HYDROCHLORIDE 1 MG: 1 INJECTION, SOLUTION INTRAMUSCULAR; INTRAVENOUS; SUBCUTANEOUS at 02:58

## 2020-02-21 RX ADMIN — ONDANSETRON 4 MG: 2 INJECTION INTRAMUSCULAR; INTRAVENOUS at 05:20

## 2020-02-21 RX ADMIN — SODIUM CHLORIDE 1000 ML: 9 INJECTION, SOLUTION INTRAVENOUS at 11:26

## 2020-02-21 RX ADMIN — ONDANSETRON 4 MG: 2 INJECTION INTRAMUSCULAR; INTRAVENOUS at 02:58

## 2020-02-21 RX ADMIN — HYDROMORPHONE HYDROCHLORIDE 1 MG: 1 INJECTION, SOLUTION INTRAMUSCULAR; INTRAVENOUS; SUBCUTANEOUS at 04:06

## 2020-02-21 RX ADMIN — TAMSULOSIN HYDROCHLORIDE 0.4 MG: 0.4 CAPSULE ORAL at 03:49

## 2020-02-21 RX ADMIN — SODIUM CHLORIDE 100 ML/HR: 9 INJECTION, SOLUTION INTRAVENOUS at 16:34

## 2020-02-21 RX ADMIN — HYDROMORPHONE HYDROCHLORIDE 1 MG: 1 INJECTION, SOLUTION INTRAMUSCULAR; INTRAVENOUS; SUBCUTANEOUS at 11:28

## 2020-02-21 NOTE — PROGRESS NOTES
Discharge Planning Assessment  [facility]     Patient Name: Britni Raphael  MRN: [id number]  Today's Date: 2/21/2020    Admit Date: 2/21/2020    Discharge Needs Assessment     Row Name 02/21/20 1453       Living Environment    Lives With  child(jonah), adult;spouse    Current Living Arrangements  home/apartment/condo    Primary Care Provided by  self    Provides Primary Care For  no one    Family Caregiver if Needed  spouse    Quality of Family Relationships  unable to assess    Able to Return to Prior Arrangements  yes       Resource/Environmental Concerns    Resource/Environmental Concerns  none       Transition Planning    Patient/Family Anticipates Transition to  home with family    Patient/Family Anticipated Services at Transition      Transportation Anticipated  family or friend will provide       Discharge Needs Assessment    Readmission Within the Last 30 Days  no previous admission in last 30 days    Concerns to be Addressed  discharge planning    Equipment Currently Used at Home  none    Anticipated Changes Related to Illness  none    Equipment Needed After Discharge  none        Discharge Plan     Row Name 02/21/20 1454       Plan    Plan  Home    Patient/Family in Agreement with Plan  yes    Plan Comments  Spoke with patient in room to initiate discharge planning.  She lives with her  and son in [facility].  She is independent with ADL's.  She has no DME and has never had home health.  Ms. Raphael has RX coverage and hsa her scripts filled at [facility].  Her plan is to return home at discharge.  She denies any needs at this time.  CM will continue to follow.  Mattie Membreno RN [phone]    Final Discharge Disposition Code  01 - home or self-care        Destination      Coordination has not been started for this encounter.      Durable Medical Equipment      Coordination has not been started for this encounter.      Dialysis/Infusion      Coordination has not been started for this  encounter.      Home Medical Care      Coordination has not been started for this encounter.      Therapy      Coordination has not been started for this encounter.      Community Resources      Coordination has not been started for this encounter.          Demographic Summary     Row Name 02/21/20 1458       General Information    Admission Type  observation    Arrived From  emergency department    Referral Source  admission list    Reason for Consult  discharge planning    Preferred Language  English     Used During This Interaction  no    General Information Comments  PCP- Ji Montaño        Functional Status     Row Name 02/21/20 1453       Functional Status    Usual Activity Tolerance  good    Current Activity Tolerance  good       Functional Status, IADL    Medications  independent    Meal Preparation  independent    Housekeeping  independent    Laundry  independent    Shopping  independent        Psychosocial    No documentation.       Abuse/Neglect    No documentation.       Legal     Row Name 02/21/20 1456       Financial/Legal    Finance Comments  Verified with patient that she has [place].  No issues obtaining medications.        Substance Abuse    No documentation.       Patient Forms    No documentation.           Mattie Membreno RN

## 2020-02-21 NOTE — CONSULTS
Urology Consult Note    Referring Provider: Ellie Harris MD  Reason for Consultation: Left ureteral stone    Patient Care Team:  Ji Montaño MD as PCP - General (Internal Medicine)    Chief complaint   Chief Complaint   Patient presents with   • Flank Pain         Subjective .     History of present illness:    Britni Raphael is a 54 y.o. female who was admitted for Renal colic on left side [N23]. Patient was referred by Dr. Harris for evaluation of Ureteral stone. Patient has history of urolithiasis.  She initially presented to the emergency department at 2 this morning with complaint of left flank pain and diagnosed with a 3.5 mm left proximal ureteral stone.  She represented to the emergency department later with continued pain with associated nausea and vomiting.  She is admitted to the hospital for observation and trial of passage.  Pain is currently well controlled.  No hematuria or dysuria.  No frequency or urgency.  No chest pain or shortness of air.  No fevers or chills.    Review of Systems  Pertinent items are noted in HPI, all other systems reviewed and negative    History  Past Medical History:   Diagnosis Date   • Arthritis    • Back pain    • Esophageal reflux    • Headache    • Hyperthyroidism    • Insomnia    • Kidney stone 07/2016   • Leukopenia    • Menopause    • Migraine headache    • Vision problem    , Past Surgical History:   Procedure Laterality Date   • AUGMENTATION MAMMAPLASTY Bilateral 2010   • COLONOSCOPY     • FOOT SURGERY     • FRACTURE SURGERY     • OOPHORECTOMY Right 2014    CYST   • OTHER SURGICAL HISTORY      Uterine surgery   • TONSILLECTOMY  age 23   , Family History   Problem Relation Age of Onset   • Heart attack Maternal Grandfather    • Heart disease Maternal Grandfather    • Heart attack Other         acute myocardial infarction   • Arthritis Other    • Cancer Other    • Diabetes Other    • Hypothyroidism Other    • Obesity Other    • Osteoarthritis Other    •  Osteoporosis Other    • Breast cancer Neg Hx    • Colon cancer Neg Hx    • Endometrial cancer Neg Hx    • Ovarian cancer Neg Hx    , Social History     Tobacco Use   • Smoking status: Never Smoker   • Smokeless tobacco: Never Used   Substance Use Topics   • Alcohol use: Yes     Comment: Pt reoprts social drinking   • Drug use: No   , Medications Prior to Admission   Medication Sig Dispense Refill Last Dose   • azelastine (OPTIVAR) 0.05 % ophthalmic solution Administer 1 drop to both eyes 2 (Two) Times a Day.   2/21/2020 at Unknown time   • estradiol (EVAMIST) 1.53 MG/SPRAY transdermal spray Place 1 spray on the skin as directed by provider Daily.   2/21/2020 at Unknown time   • eszopiclone (LUNESTA) 2 MG tablet Take 1 tablet by mouth Every Night. Take immediately before bedtime 30 tablet 1 Past Week at Unknown time   • ipratropium (ATROVENT) 0.06 % nasal spray 1 spray into the nostril(s) as directed by provider Daily.   2/21/2020 at Unknown time   • methocarbamol (ROBAXIN) 500 MG tablet Take 1 tablet by mouth 4 (Four) Times a Day. (Patient taking differently: Take 500 mg by mouth 4 (Four) Times a Day As Needed.) 30 tablet 1 Past Month at Unknown time   • progesterone (PROMETRIUM) 100 MG capsule Take 100 mg by mouth Every Evening.   2/20/2020 at Unknown time   • rizatriptan (MAXALT) 10 MG tablet TAKE 1 TABLET BY MOUTH ONE TIME A DAY AS NEEDED FOR MIGRAINE (max 1 tab in 24 hours) 3 tablet 0 Past Month at Unknown time   • estradiol (VAGIFEM) 10 MCG tablet vaginal tablet Insert 1 tablet into the vagina 2 (Two) Times a Week.   Unknown at Unknown time   • oxyCODONE-acetaminophen (PERCOCET) 5-325 MG per tablet Take 1 tablet by mouth Every 4 (Four) Hours As Needed for Moderate Pain  or Severe Pain . 10 tablet 0 Unknown at Unknown time   • prochlorperazine (COMPAZINE) 25 MG suppository Insert 1 suppository into the rectum Every 12 (Twelve) Hours As Needed for Nausea or Vomiting. 10 suppository 0 Unknown at Unknown time   •  sulfamethoxazole-trimethoprim (BACTRIM,SEPTRA) 400-80 MG tablet Take 1 tablet by mouth 2 (Two) Times a Day.   Unknown at Unknown time   • tamsulosin (FLOMAX) 0.4 MG capsule 24 hr capsule Take 1 capsule by mouth Daily for 10 days. 10 capsule 0 Unknown at Unknown time   , Scheduled Meds:    ipratropium 1 spray Each Nare 4x Daily - RT   ketotifen 1 drop Both Eyes BID   progesterone 200 mg Oral Nightly   sennosides-docusate 2 tablet Oral BID   sodium chloride 10 mL Intravenous Q12H   tamsulosin 0.4 mg Oral Nightly   zolpidem 2.5 mg Oral Nightly   , Continuous Infusions:    sodium chloride 100 mL/hr Last Rate: 100 mL/hr (02/21/20 1634)   , PRN Meds:  •  acetaminophen **OR** acetaminophen **OR** acetaminophen  •  calcium carbonate EX  •  dextrose  •  dextrose  •  glucagon (human recombinant)  •  HYDROmorphone **AND** naloxone  •  magnesium sulfate **OR** magnesium sulfate **OR** magnesium sulfate  •  methocarbamol  •  ondansetron **OR** ondansetron  •  oxyCODONE-acetaminophen  •  potassium chloride **OR** potassium chloride **OR** potassium chloride  •  prochlorperazine  •  Insert peripheral IV **AND** sodium chloride  •  sodium chloride and Allergies:  Codeine; Hydrocodone-acetaminophen; Nitrofurantoin; and Percocet [oxycodone-acetaminophen]    Objective     Vital Signs   Temp:  [97.6 °F (36.4 °C)-97.9 °F (36.6 °C)] 97.9 °F (36.6 °C)  Heart Rate:  [] 95  Resp:  [18-24] 18  BP: ()/(51-73) 96/51    Physical Exam:   General Appearance: alert, appears stated age and cooperative  Head: normocephalic, without obvious abnormality and atraumatic  Neck: supple and trachea midline  Back: no tenderness to percussion or palpation and range of motion normal  Lungs: respirations regular, respirations even and respirations unlabored  Heart: regular rhythm & normal rate  Abdomen: soft non-tender and no guarding  Extremities: moves extremities well and no edema  Neurologic: Mental Status orientated to person, place, time and  situation  Psych: normal        Results Review:   I reviewed the patient's new clinical results.  Lab Results (last 24 hours)     Procedure Component Value Units Date/Time    Hemoglobin A1c [[id number]]  (Normal) Collected:  02/21/20 1129    Specimen:  Blood Updated:  02/21/20 1528     Hemoglobin A1C 5.20 %     Narrative:       Hemoglobin A1C Ranges:    Increased Risk for Diabetes  5.7% to 6.4%  Diabetes                     >= 6.5%  Diabetic Goal                < 7.0%    Urinalysis With Microscopic If Indicated (No Culture) - Urine, Clean Catch [[id number]]  (Abnormal) Collected:  02/21/20 1129    Specimen:  Urine, Clean Catch Updated:  02/21/20 1200     Color, UA Yellow     Appearance, UA Clear     pH, UA <=5.0     Specific Gravity, UA 1.015     Glucose,  mg/dL (2+)     Ketones, UA Negative     Bilirubin, UA Negative     Blood, UA Trace     Protein, UA Negative     Leuk Esterase, UA Negative     Nitrite, UA Negative     Urobilinogen, UA 0.2 E.U./dL    Urinalysis, Microscopic Only - Urine, Clean Catch [[id number]] Collected:  02/21/20 1129    Specimen:  Urine, Clean Catch Updated:  02/21/20 1200     RBC, UA 0-2 /HPF      WBC, UA None Seen /HPF      Bacteria, UA None Seen /HPF      Squamous Epithelial Cells, UA None Seen /HPF      Hyaline Casts, UA 0-6 /LPF      Methodology Automated Microscopy    CBC & Differential [[id number]] Collected:  02/21/20 1129    Specimen:  Blood Updated:  02/21/20 1159    Narrative:       The following orders were created for panel order CBC & Differential.  Procedure                               Abnormality         Status                     ---------                               -----------         ------                     CBC Auto Differential[[id number]]        Abnormal            Final result                 Please view results for these tests on the individual orders.    CBC Auto Differential [[id number]]  (Abnormal) Collected:  02/21/20 1129    Specimen:  Blood Updated:   02/21/20 1159     WBC 7.88 10*3/mm3      RBC 4.10 10*6/mm3      Hemoglobin 12.7 g/dL      Hematocrit 40.1 %      MCV 97.8 fL      MCH 31.0 pg      MCHC 31.7 g/dL      RDW 11.7 %      RDW-SD 42.1 fl      MPV 11.6 fL      Platelets 189 10*3/mm3      Neutrophil % 93.2 %      Lymphocyte % 4.2 %      Monocyte % 1.9 %      Eosinophil % 0.0 %      Basophil % 0.3 %      Immature Grans % 0.4 %      Neutrophils, Absolute 7.35 10*3/mm3      Lymphocytes, Absolute 0.33 10*3/mm3      Monocytes, Absolute 0.15 10*3/mm3      Eosinophils, Absolute 0.00 10*3/mm3      Basophils, Absolute 0.02 10*3/mm3      Immature Grans, Absolute 0.03 10*3/mm3      nRBC 0.0 /100 WBC          Imaging Results (Last 24 Hours)     ** No results found for the last 24 hours. **          Labs  Urine Microscopy:   Results from last 7 days   Lab Units 02/21/20  1129   RBC UA /HPF 0-2   WBC UA /HPF None Seen   , Urinalysis:   Results from last 7 days   Lab Units 02/21/20  1129   PH, URINE  <=5.0   PROTEIN UA  Negative   GLUCOSE UA  500 mg/dL (2+)*   KETONES UA  Negative   , Urine Culture:   , BMP:   Results from last 7 days   Lab Units 02/21/20  0257   SODIUM mmol/L 141   POTASSIUM mmol/L 4.1   CALCIUM mg/dL 9.7   CHLORIDE mmol/L 101   CO2 mmol/L 23.0   BUN mg/dL 31*   CREATININE mg/dL 1.05*   ALBUMIN g/dL 4.70   BILIRUBIN mg/dL 0.6   ALK PHOS U/L 67    and CBC:   Results from last 7 days   Lab Units 02/21/20  1129   WBC 10*3/mm3 7.88   RBC 10*6/mm3 4.10   HEMOGLOBIN g/dL 12.7   HEMATOCRIT % 40.1   PLATELETS 10*3/mm3 189       Imaging  CT Abdomen: 3.5 mm left proximal ureteral stone      Assessment   54 y.o. female with Ureteral stone      Plan   Monitor symptoms overnight with trial of passage.  If pain is controlled or she passes a stone then likely discharge home tomorrow.  If she continues to have renal colic may proceed with ureteroscopic stone extraction with stent placement.      I discussed the patients findings and my recommendations with  patient    Carlito Davis MD  02/21/20  5:45 PM

## 2020-02-21 NOTE — H&P
[facility] Medicine Services  Clinical Decision Unit  HISTORY & PHYSICAL    Patient Name: Britni Raphael  : 1965  MRN: [id number]  Primary Care Physician: Ji Montaño MD  Date of admission: 2020 10:37 AM    Subjective   Subjective   Chief Complaint:  Left flank pain, N/V    HPI:  Britni Raphael is a 54 y.o. female with PMH significant for GERD, migraine, hypothyroidism, kidney stones presented to [facility] ED at 0 200 this morning with left flank pain and was diagnosed with a 3.5 mm left sided kidney stone with hydronephrosis.  Patient came back to the ED at 10:30AM with nausea and vomiting and left flank pain.  Labs were redrawn and WBC dropped from from 13.04 at 2 AM to 7.88 at 11:30 AM today.  Patient's glucose is slightly elevated, but patient does not have a diagnosis of diabetes.  Dr. Kim is aware of patient and would like her to attempt to pass the stone overnight since it is small, but if unable to, he will place a stent in the morning. Patient does not meet Sepsis criteria. Patient will be admitted to hospital medicine service for further evaluation and treatment.    Review of Systems   Gen- No fevers, chills  CV- No chest pain, palpitations  Resp- No cough, dyspnea  GI- No D, abd pain; +N/V  : +left flank pain  All other systems have been reviewed and the pertinent positives and negatives are listed above in the HPI and ROS  Personal History     Past Medical History:   Diagnosis Date   • Arthritis    • Back pain    • Esophageal reflux    • Headache    • Hyperthyroidism    • Insomnia    • Kidney stone 2016   • Leukopenia    • Menopause    • Migraine headache    • Vision problem      Past Surgical History:   Procedure Laterality Date   • AUGMENTATION MAMMAPLASTY Bilateral    • COLONOSCOPY     • FOOT SURGERY     • FRACTURE SURGERY     • OOPHORECTOMY Right     CYST   • OTHER SURGICAL HISTORY      Uterine surgery   • TONSILLECTOMY  age 23     Family  History: family history includes Arthritis in an other family member; Cancer in an other family member; Diabetes in an other family member; Heart attack in her maternal grandfather and another family member; Heart disease in her maternal grandfather; Hypothyroidism in an other family member; Obesity in an other family member; Osteoarthritis in an other family member; Osteoporosis in an other family member. Otherwise pertinent FHx was reviewed and unremarkable.     Social History:  reports that she has never smoked. She has never used smokeless tobacco. She reports that she drinks alcohol. She reports that she does not use drugs.  Social History     Social History Narrative   • Not on file     Medications:  Available home medication information reviewed.  Scheduled Meds:  insulin lispro 0-7 Units Subcutaneous 4x Daily With Meals & Nightly   ipratropium 1 spray Each Nare 4x Daily - RT   ketotifen 1 drop Both Eyes BID   progesterone 200 mg Oral Nightly   sennosides-docusate 2 tablet Oral BID   sodium chloride 10 mL Intravenous Q12H   tamsulosin 0.4 mg Oral Nightly   zolpidem 2.5 mg Oral Nightly     Continuous Infusions:  sodium chloride 100 mL/hr     PRN Meds:.•  acetaminophen **OR** acetaminophen **OR** acetaminophen  •  calcium carbonate EX  •  dextrose  •  dextrose  •  glucagon (human recombinant)  •  HYDROmorphone **AND** naloxone  •  magnesium sulfate **OR** magnesium sulfate **OR** magnesium sulfate  •  methocarbamol  •  ondansetron **OR** ondansetron  •  oxyCODONE-acetaminophen  •  potassium chloride **OR** potassium chloride **OR** potassium chloride  •  prochlorperazine  •  Insert peripheral IV **AND** sodium chloride  •  sodium chloride    Allergies   Allergen Reactions   • Codeine Hives   • Hydrocodone-Acetaminophen    • Nitrofurantoin    • Percocet [Oxycodone-Acetaminophen] Itching     Objective   Objective   Vital Signs:   Temp:  [97.6 °F (36.4 °C)-97.8 °F (36.6 °C)] 97.6 °F (36.4 °C)  Heart Rate:   [] 102  Resp:  [24] 24  BP: ()/(54-73) 111/65   Physical Exam   Constitutional: Alert, WD, WN female in NAD  Eyes: EOMI, sclerae anicteric, no conjunctival injection  Head: NCAT  ENT: [place], moist mucous membranes   Respiratory: Non-labored, symmetrical chest expansion, CTAB  Cardiovascular: RRR, no M/R/G, +DP pulses bilaterally  Gastrointestinal: Soft, NT, ND +BS  : +left CVA tenderness  Musculoskeletal: CROSS; no LE edema bilaterally  Neurologic: Oriented x4, strength symmetric in all extremities, follows all commands, speech clear  Skin: No rashes on exposed skin  Psychiatric: Pleasant and cooperative; normal affect    Results Reviewed:  UA blood only  CBC WNL except RDW and MCV  0257 AM CMP glucose 151, anion gap 17, CR 1.05, BUN 31; otherwise WNL    CT A/P- 3.5 mm proximal left ureteral stone causing moderate hydronephrosis; 2 Tiny stones are present in the right kidney.    Assessment/Plan   Assessment / Plan     Active Hospital Problems    Diagnosis POA   • **Renal colic on left side [N23] Yes     Plan:  Nephrolithiasis  --IV fluids  --IV pain medication; change to oral as tolerated  --PO Flomax  --Dr Kim aware but wants to try to have her pass stone (3.5mm); if  Not passed, will place stent in the morning    Hyperglycemia  --A1c pending.  --QACHS glucose checks  --SSI  --Consistent carb diet    CODE STATUS:    Code Status and Medical Interventions:   Ordered at: 02/21/20 1326     Level Of Support Discussed With:    Patient     Code Status:    CPR     Medical Interventions (Level of Support Prior to Arrest):    Full     Admission Status:   I believe this patient meets OBSERVATION status, however if further evaluation or treatment plans warrant, status may change.  Based upon current information, I predict patient's care encounter to be less than or equal to 2 midnights.    Discharge Blueprint (criteria for discharge):   Pain Free    Electronically signed by JUAN Lama, 02/21/20, 1:27  PM.

## 2020-02-21 NOTE — ED PROVIDER NOTES
Subjective   Britni Raphael is a 54 y.o. female who presents to the emergency department with complaints of left flank pain. She was evaluated at 0200 this morning and diagnosed with a kidney stone. She has nausea and vomiting. Mrs. Raphael says that when she younger she was told she had small ureters that had to be stretched. She denies any other acute symptoms at this time.       History provided by:  Patient  Flank Pain   Pain location:  L flank  Pain radiates to:  Does not radiate  Pain severity:  Mild  Onset quality:  Sudden  Duration:  8 hours  Timing:  Constant  Progression:  Worsening  Chronicity:  New  Relieved by:  Nothing  Worsened by:  Nothing  Associated symptoms: nausea and vomiting    Associated symptoms: no chest pain, no chills, no diarrhea, no dysuria, no fever and no hematuria        Review of Systems   Constitutional: Negative for chills and fever.   Cardiovascular: Negative for chest pain.   Gastrointestinal: Positive for nausea and vomiting. Negative for diarrhea.   Genitourinary: Positive for flank pain. Negative for dysuria and hematuria.   All other systems reviewed and are negative.      Past Medical History:   Diagnosis Date   • Arthritis    • Back pain    • Esophageal reflux    • Headache    • Hyperthyroidism    • Insomnia    • Kidney stone 07/2016   • Leukopenia    • Menopause    • Migraine headache    • Vision problem        Allergies   Allergen Reactions   • Codeine Hives   • Hydrocodone-Acetaminophen    • Nitrofurantoin    • Percocet [Oxycodone-Acetaminophen] Itching       Past Surgical History:   Procedure Laterality Date   • AUGMENTATION MAMMAPLASTY Bilateral 2010   • COLONOSCOPY     • CYSTOSCOPY, URETEROSCOPY, RETROGRADE PYELOGRAM, STONE EXTRACTION, STENT INSERTION Left 2/22/2020    Procedure: CYSTOSCOPY LEFT URETEROSCOPY RETROGRADE PYELOGRAM BASKET STONE EXTRACTION STENT INSERTION LASER STANDBY;  Surgeon: Carlito Davis MD;  [facility];  Service: Urology;   Laterality: Left;   • FOOT SURGERY     • FRACTURE SURGERY     • OOPHORECTOMY Right 2014    CYST   • OTHER SURGICAL HISTORY      Uterine surgery   • TONSILLECTOMY  age 23       Family History   Problem Relation Age of Onset   • Heart attack Maternal Grandfather    • Heart disease Maternal Grandfather    • Heart attack Other         acute myocardial infarction   • Arthritis Other    • Cancer Other    • Diabetes Other    • Hypothyroidism Other    • Obesity Other    • Osteoarthritis Other    • Osteoporosis Other    • Breast cancer Neg Hx    • Colon cancer Neg Hx    • Endometrial cancer Neg Hx    • Ovarian cancer Neg Hx        Social History     Socioeconomic History   • Marital status:      Spouse name: Not on file   • Number of children: Not on file   • Years of education: Not on file   • Highest education level: Not on file   Tobacco Use   • Smoking status: Never Smoker   • Smokeless tobacco: Never Used   Substance and Sexual Activity   • Alcohol use: Yes     Comment: Pt reoprts social drinking   • Drug use: No   • Sexual activity: Yes     Partners: Male         Objective   Physical Exam   Constitutional: She is oriented to person, place, and time. She appears well-developed and well-nourished.   Mrs. Raphael appears uncomfortable.    HENT:   Head: Normocephalic and atraumatic.   Eyes: Conjunctivae are normal. No scleral icterus.   Neck: Normal range of motion. Neck supple.   Cardiovascular: Normal rate, regular rhythm and normal heart sounds.   Pulmonary/Chest: Effort normal and breath sounds normal. No respiratory distress. She has no wheezes. She has no rales.   Abdominal: Soft. There is no tenderness. There is no guarding.   Musculoskeletal: Normal range of motion.   Neurological: She is alert and oriented to person, place, and time.   Skin: Skin is warm and dry. She is not diaphoretic.   Psychiatric: She has a normal mood and affect. Her behavior is normal.   Nursing note and vitals  reviewed.      Procedures         ED Course  ED Course as of Feb 27 0624 Fri Feb 21, 2020   1045 Mrs. Raphael sepsis screen is positive.  The triage nurse wrote down suspected infection due to abdominal pain and diarrhea.  The patient denies both of those complaints.  Will repeat her CBC and urine although I do not think this is sepsis.  I think her elevated heart rate and respiratory rate are due to pain    [DT]   1254 I discussed with Dr. Kim who agreed with admission.  He felt with IV fluids and Flomax that she likely would pass this.  He will see her in the hospital.  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    [DT]   1257 White blood cell count is normalized and urine continues to show no infection.    [DT]      ED Course User Index  [DT] Fei Beltran MD          No results found for this or any previous visit (from the past 24 hour(s)).  Note: In addition to lab results from this visit, the labs listed above may include labs taken at another facility or during a different encounter within the last 24 hours. Please correlate lab times with ED admission and discharge times for further clarification of the services performed during this visit.    FL C Arm During Surgery   Final Result   Fluoroscopy provided cystoscopy.       DICTATED:   02/22/2020   EDITED/ls :   02/23/2020             This report was finalized on 2/23/2020 10:23 PM by Dr. Dev Matute MD.            Vitals:    02/23/20 0408 02/23/20 0700 02/23/20 0811 02/23/20 1035   BP: 112/66 119/68     BP Location: Left arm Left arm     Patient Position: Lying Lying     Pulse: 85 84 77 88   Resp: 18 18     Temp: 98.2 °F (36.8 °C) 98.3 °F (36.8 °C)     TempSrc: Oral Oral     SpO2:   96%    Weight:       Height:         Medications   levoFLOXacin (LEVAQUIN) 500 mg/100 mL D5W (premix) (LEVAQUIN) 500 mg ( Intravenous MAR Unhold 2/22/20 4313)   HYDROmorphone  (DILAUDID) injection 1 mg (1 mg Intravenous Given 2/21/20 1128)   sodium chloride 0.9 % bolus 1,000 mL (0 mL Intravenous Stopped 2/21/20 1309)   promethazine (PHENERGAN) injection 6.25 mg (6.25 mg Intravenous Given 2/22/20 1725)     Or   promethazine (PHENERGAN) injection 6.25 mg ( Intramuscular Not Given:  See Alt 2/22/20 1725)     Or   promethazine (PHENERGAN) suppository 25 mg ( Rectal Not Given:  See Alt 2/22/20 1725)     Or   promethazine (PHENERGAN) tablet 25 mg ( Oral Not Given:  See Alt 2/22/20 1725)     ECG/EMG Results (last 24 hours)     ** No results found for the last 24 hours. **        No orders to display                  Bret Coma Scale Score: 15                            MDM  Number of Diagnoses or Management Options  Obstruction of left ureter: new and requires workup  Renal colic on left side: new and requires workup  Ureterolithiasis: new and requires workup     Amount and/or Complexity of Data Reviewed  Clinical lab tests: reviewed and ordered  Tests in the radiology section of CPT®: ordered and reviewed  Discuss the patient with other providers: yes  Independent visualization of images, tracings, or specimens: yes    Patient Progress  Patient progress: improved      Final diagnoses:   Renal colic on left side   Obstruction of left ureter   Ureterolithiasis       Documentation assistance provided by maribel Galindo.  Information recorded by the scribe was done at my direction and has been verified and validated by me.     Asiya Galindo  02/21/20 2499       Fei Beltran MD  02/27/20 9325

## 2020-02-21 NOTE — PLAN OF CARE
Patient reports no pain at this time, up to bathroom numerous times with no stone noted in strainer. VSS, in NAD, will continue to monitor. IV fluids running.

## 2020-02-21 NOTE — ED PROVIDER NOTES
EMERGENCY DEPARTMENT ENCOUNTER      Pt Name: Britni Raphael  MRN: [id number]  YOB: 1965  Date of evaluation: 2/21/2020  Provider: Ranjan Torres DO    CHIEF COMPLAINT       Chief Complaint   Patient presents with   • Flank Pain         HISTORY OF PRESENT ILLNESS  (Location/Symptom, Timing/Onset, Context/Setting, Quality, Duration, Modifying Factors, Severity.)   Britni Raphael is a 54 y.o. female who presents to the emergency department for evaluation of left-sided flank pain which rates to the left lower abdomen sudden onset which woke her from sleep couple hours prior to arrival.  Is a history of kidney stones which feels similar, last one was a few years ago.  No prior urological interventions.  She denies any dysuria, no chest pain or difficulty breathing.  She denies any other acute systemic complaints at this time.  No hematuria, she denies any other acute complaints.  No fall, injury or trauma.      Nursing notes were reviewed.    REVIEW OF SYSTEMS    (2-9 systems for level 4, 10 or more for level 5)   ROS:  General:  No fevers, no chills, no weakness  Cardiovascular:  No chest pain, no palpitations  Respiratory:  No shortness of breath, no cough, no wheezing  Gastrointestinal: Positive left flank, abdomen, nausea vomiting, no diarrhea  Musculoskeletal:  No muscle pain, no joint pain  Skin:  No rash, no easy bruising  Neurologic:  No speech problems, no headache, no extremity numbness, no extremity tingling, no extremity weakness  Psychiatric:  No anxiety  Genitourinary:  No dysuria, no hematuria    Except as noted above the remainder of the review of systems was reviewed and negative.       PAST MEDICAL HISTORY     Past Medical History:   Diagnosis Date   • Arthritis    • Back pain    • Esophageal reflux    • Headache    • Hyperthyroidism    • Insomnia    • Kidney stone 07/2016   • Leukopenia    • Menopause    • Migraine headache    • Vision problem          SURGICAL HISTORY        Past Surgical History:   Procedure Laterality Date   • AUGMENTATION MAMMAPLASTY Bilateral 2010   • COLONOSCOPY     • FOOT SURGERY     • FRACTURE SURGERY     • OOPHORECTOMY Right 2014    CYST   • OTHER SURGICAL HISTORY      Uterine surgery   • TONSILLECTOMY  age 23         CURRENT MEDICATIONS       Current Facility-Administered Medications:   •  HYDROmorphone (DILAUDID) injection 1 mg, 1 mg, Intravenous, Q30 Min PRN, Ranjan Torres DO, 1 mg at 02/21/20 0523  •  ketorolac (TORADOL) injection 30 mg, 30 mg, Intravenous, Once, Ranjan Torres DO  •  sodium chloride 0.9 % flush 10 mL, 10 mL, Intravenous, PRN, Ranjan Torres DO    Current Outpatient Medications:   •  azelastine (OPTIVAR) 0.05 % ophthalmic solution, , Disp: , Rfl:   •  estradiol (EVAMIST) 1.53 MG/SPRAY transdermal spray, Place  on the skin., Disp: , Rfl:   •  estradiol (VAGIFEM) 10 MCG tablet vaginal tablet, Insert  into the vagina., Disp: , Rfl:   •  eszopiclone (LUNESTA) 2 MG tablet, Take 1 tablet by mouth Every Night. Take immediately before bedtime, Disp: 30 tablet, Rfl: 1  •  ipratropium (ATROVENT) 0.06 % nasal spray, , Disp: , Rfl:   •  methocarbamol (ROBAXIN) 500 MG tablet, Take 1 tablet by mouth 4 (Four) Times a Day. (Patient taking differently: Take 500 mg by mouth 4 (Four) Times a Day As Needed.), Disp: 30 tablet, Rfl: 1  •  progesterone (PROMETRIUM) 100 MG capsule, Take  by mouth., Disp: , Rfl:   •  rizatriptan (MAXALT) 10 MG tablet, TAKE 1 TABLET BY MOUTH ONE TIME A DAY AS NEEDED FOR MIGRAINE (max 1 tab in 24 hours), Disp: 3 tablet, Rfl: 0  •  sulfamethoxazole-trimethoprim (BACTRIM,SEPTRA) 400-80 MG tablet, Take 1 tablet by mouth 2 (Two) Times a Day., Disp: , Rfl:   •  ondansetron ODT (ZOFRAN-ODT) 4 MG disintegrating tablet, Take 1 tablet by mouth 4 (Four) Times a Day As Needed for Nausea or Vomiting., Disp: 15 tablet, Rfl: 0  •  oxyCODONE-acetaminophen (PERCOCET) 5-325 MG per tablet, Take 1 tablet by mouth Every 4  "(Four) Hours As Needed for Moderate Pain  or Severe Pain ., Disp: 10 tablet, Rfl: 0  •  Sod Picosulfate-Mag Ox-Cit Acd 10-3.5-12 MG-GM -GM/160ML solution, Take 1 kit by mouth Take As Directed. Follow instructions that were mailed to your home. If you didn't receive these call [phone]., Disp: 2 bottle, Rfl: 0  •  tamsulosin (FLOMAX) 0.4 MG capsule 24 hr capsule, Take 1 capsule by mouth Daily for 10 days., Disp: 10 capsule, Rfl: 0    ALLERGIES     Codeine; Hydrocodone-acetaminophen; Nitrofurantoin; and Percocet [oxycodone-acetaminophen]    FAMILY HISTORY       Family History   Problem Relation Age of Onset   • Heart attack Maternal Grandfather    • Heart disease Maternal Grandfather    • Heart attack Other         acute myocardial infarction   • Arthritis Other    • Cancer Other    • Diabetes Other    • Hypothyroidism Other    • Obesity Other    • Osteoarthritis Other    • Osteoporosis Other    • Breast cancer Neg Hx    • Colon cancer Neg Hx    • Endometrial cancer Neg Hx    • Ovarian cancer Neg Hx           SOCIAL HISTORY       Social History     Socioeconomic History   • Marital status:      Spouse name: Not on file   • Number of children: Not on file   • Years of education: Not on file   • Highest education level: Not on file   Tobacco Use   • Smoking status: Never Smoker   • Smokeless tobacco: Never Used   Substance and Sexual Activity   • Alcohol use: Yes     Comment: Pt reoprts social drinking   • Drug use: No   • Sexual activity: Yes     Partners: Male         PHYSICAL EXAM    (up to 7 for level 4, 8 or more for level 5)     Vitals:    02/21/20 0222 02/21/20 0230 02/21/20 0300 02/21/20 0330   BP: 125/69 113/58 115/70 122/73   BP Location: Left arm      Patient Position: Sitting      Pulse: 94   88   Resp: 24      Temp: 97.8 °F (36.6 °C)      TempSrc: Oral      SpO2: 100%   97%   Weight: 66.7 kg (147 lb)      Height: 165.1 cm (65\")          Physical Exam  General :Patient is awake, alert, oriented, " acutely uncomfortable, having a difficult time finding position of comfort  HEENT: Pupils are equally round and reactive to light, EOMI, conjunctivae clear, sclerae white, there is no injection no icterus.  Oral mucosa is moist, no exudate. Uvula is midline  Neck: Neck is supple, full range of motion, trachea midline  Cardiac: Heart regular rate, rhythm, no murmurs, rubs, or gallops  Lungs: Lungs are clear to auscultation, there is no wheezing, rhonchi, or rales. There is no use of accessory muscles.  Chest wall: There is no tenderness to palpation over the chest wall or over ribs  Abdomen: Abdomen is soft, nondistended.  There is tenderness along the left CVA, left lower abdomen.  No peritoneal signs. There is no firm or pulsatile masses, no rebound rigidity or guarding.   Musculoskeletal: 5 out of 5 strength in all 4 extremities.  No focal muscle deficits are appreciated  Neuro: Motor intact, sensory intact, level of consciousness is normal  Dermatology: Skin is warm and dry  Psych: Mentation is grossly normal, cognition is grossly normal. Affect is appropriate.      DIAGNOSTIC RESULTS     EKG: All EKG's are interpreted by the Emergency Department Physician who either signs or Co-signs this chart in the absence of a cardiologist.    No orders to display       RADIOLOGY:   Non-plain film images such as CT, Ultrasound and MRI are read by the radiologist. Plain radiographic images are visualized and preliminarily interpreted by the emergency physician with the below findings:      [] Radiologist's Report Reviewed:  CT Abdomen Pelvis Without Contrast   Final Result   3.5 mm proximal left ureteral stone causing moderate hydronephrosis.   2 Tiny stones are present in the right kidney.      Otherwise normal.               Signer Name: Charly Garcia MD    Signed: 2/21/2020 3:33 AM    Workstation Name: [id number]     [facility]            ED BEDSIDE ULTRASOUND:   Performed by ED Physician -  none    LABS:    I have reviewed and interpreted all of the currently available lab results from this visit (if applicable):  Results for orders placed or performed during the hospital encounter of 02/21/20   Comprehensive Metabolic Panel   Result Value Ref Range    Glucose 151 (H) 65 - 99 mg/dL    BUN 31 (H) 6 - 20 mg/dL    Creatinine 1.05 (H) 0.57 - 1.00 mg/dL    Sodium 141 136 - 145 mmol/L    Potassium 4.1 3.5 - 5.2 mmol/L    Chloride 101 98 - 107 mmol/L    CO2 23.0 22.0 - 29.0 mmol/L    Calcium 9.7 8.6 - 10.5 mg/dL    Total Protein 7.1 6.0 - 8.5 g/dL    Albumin 4.70 3.50 - 5.20 g/dL    ALT (SGPT) 12 1 - 33 U/L    AST (SGOT) 20 1 - 32 U/L    Alkaline Phosphatase 67 39 - 117 U/L    Total Bilirubin 0.6 0.2 - 1.2 mg/dL    eGFR Non African Amer 54 (L) >60 mL/min/1.73    Globulin 2.4 gm/dL    A/G Ratio 2.0 g/dL    BUN/Creatinine Ratio 29.5 (H) 7.0 - 25.0    Anion Gap 17.0 (H) 5.0 - 15.0 mmol/L   Lipase   Result Value Ref Range    Lipase 25 13 - 60 U/L   Urinalysis With Microscopic If Indicated (No Culture) - Urine, Clean Catch   Result Value Ref Range    Color, UA Yellow Yellow, Straw    Appearance, UA Clear Clear    pH, UA 5.5 5.0 - 8.0    Specific Gravity, UA 1.013 1.001 - 1.030    Glucose, UA Negative Negative    Ketones, UA Negative Negative    Bilirubin, UA Negative Negative    Blood, UA Negative Negative    Protein, UA Negative Negative    Leuk Esterase, UA Negative Negative    Nitrite, UA Negative Negative    Urobilinogen, UA 0.2 E.U./dL 0.2 - 1.0 E.U./dL   CBC Auto Differential   Result Value Ref Range    WBC 13.04 (H) 3.40 - 10.80 10*3/mm3    RBC 4.31 3.77 - 5.28 10*6/mm3    Hemoglobin 13.5 12.0 - 15.9 g/dL    Hematocrit 41.6 34.0 - 46.6 %    MCV 96.5 79.0 - 97.0 fL    MCH 31.3 26.6 - 33.0 pg    MCHC 32.5 31.5 - 35.7 g/dL    RDW 11.6 (L) 12.3 - 15.4 %    RDW-SD 41.1 37.0 - 54.0 fl    MPV 11.8 6.0 - 12.0 fL    Platelets 227 140 - 450 10*3/mm3    Neutrophil % 76.2 (H) 42.7 - 76.0 %    Lymphocyte % 17.4 (L) 19.6  "- 45.3 %    Monocyte % 4.2 (L) 5.0 - 12.0 %    Eosinophil % 1.1 0.3 - 6.2 %    Basophil % 0.6 0.0 - 1.5 %    Immature Grans % 0.5 0.0 - 0.5 %    Neutrophils, Absolute 9.94 (H) 1.70 - 7.00 10*3/mm3    Lymphocytes, Absolute 2.27 0.70 - 3.10 10*3/mm3    Monocytes, Absolute 0.55 0.10 - 0.90 10*3/mm3    Eosinophils, Absolute 0.14 0.00 - 0.40 10*3/mm3    Basophils, Absolute 0.08 0.00 - 0.20 10*3/mm3    Immature Grans, Absolute 0.06 (H) 0.00 - 0.05 10*3/mm3    nRBC 0.0 0.0 - 0.2 /100 WBC   Light Blue Top   Result Value Ref Range    Extra Tube hold for add-on    Green Top (Gel)   Result Value Ref Range    Extra Tube Hold for add-ons.    Lavender Top   Result Value Ref Range    Extra Tube hold for add-on    Gold Top - SST   Result Value Ref Range    Extra Tube Hold for add-ons.         All other labs were within normal range or not returned as of this dictation.      EMERGENCY DEPARTMENT COURSE and DIFFERENTIAL DIAGNOSIS/MDM:   Vitals:    Vitals:    02/21/20 0222 02/21/20 0230 02/21/20 0300 02/21/20 0330   BP: 125/69 113/58 115/70 122/73   BP Location: Left arm      Patient Position: Sitting      Pulse: 94   88   Resp: 24      Temp: 97.8 °F (36.6 °C)      TempSrc: Oral      SpO2: 100%   97%   Weight: 66.7 kg (147 lb)      Height: 165.1 cm (65\")           !    Patient with left-sided flank pain, history of renal colic, kidney stones.  Appears similar nature.  Does appear acutely uncomfortable, vital signs are stable.  We did obtain IV, patient was given IV fluids, pain control and Zofran.  As well as imaging of the abdomen/pelvis for further evaluation and sizing of the stone.  Patient with 3.5 mm left proximal stone.  Urinalysis with no acute infectious etiology.  Patient was updated on these findings, we discussed treatment with pain and nausea control, Flomax, good fluid hydration.  She has seen a urologist in the past, who she will call for follow-up appointment.  Return precautions discussed. The patient will follow-up " with their PCP in 1-2 days for reevaluation.  If the patient or family members have any further concerns or patient has any worsening symptoms they will return to the ED for reevaluation.      MEDICATIONS ADMINISTERED IN ED:  Medications   sodium chloride 0.9 % flush 10 mL (has no administration in time range)   HYDROmorphone (DILAUDID) injection 1 mg (1 mg Intravenous Given 2/21/20 0523)   ketorolac (TORADOL) injection 30 mg (30 mg Intravenous Not Given 2/21/20 0352)   ondansetron (ZOFRAN) injection 4 mg (4 mg Intravenous Given 2/21/20 0520)   sodium chloride 0.9 % bolus 1,000 mL (0 mL Intravenous Stopped 2/21/20 0332)   tamsulosin (FLOMAX) 24 hr capsule 0.4 mg (0.4 mg Oral Given 2/21/20 0349)   sodium chloride 0.9 % bolus 1,000 mL (0 mL Intravenous Stopped 2/21/20 0500)       PROCEDURES:  Procedures    CRITICAL CARE TIME    Total Critical Care time was 0 minutes, excluding separately reportable procedures.   There was a high probability of clinically significant/life threatening deterioration in the patient's condition which required my urgent intervention.      FINAL IMPRESSION      1. Kidney stone on left side    2. Left flank pain          DISPOSITION/PLAN     ED Disposition     ED Disposition Condition Comment    Discharge Stable           PATIENT REFERRED TO:  Ji Montaño MD  [address] [facility] [address]  [facility] [address] [phone]    In 2 days      [place]  [address] [place] [address] [id number]  [facility] [address]  [phone]    Schedule an appointment as soon as possible for a visit   Your Urologist    [facility] Emergency Department  [address] [facility] [address] [phone]    If symptoms worsen      DISCHARGE MEDICATIONS:     Medication List      START taking these medications    ondansetron ODT 4 MG disintegrating tablet  Commonly known as:  ZOFRAN-ODT  Take 1 tablet by mouth 4 (Four) Times a Day As Needed for Nausea or   Vomiting.      oxyCODONE-acetaminophen 5-325 MG per tablet  Commonly known as:  PERCOCET  Take 1 tablet by mouth Every 4 (Four) Hours As Needed for Moderate Pain    or Severe Pain .     tamsulosin 0.4 MG capsule 24 hr capsule  Commonly known as:  FLOMAX  Take 1 capsule by mouth Daily for 10 days.        CHANGE how you take these medications    methocarbamol 500 MG tablet  Commonly known as:  ROBAXIN  Take 1 tablet by mouth 4 (Four) Times a Day.  What changed:    when to take this  reasons to take this        CONTINUE taking these medications    azelastine 0.05 % ophthalmic solution  Commonly known as:  OPTIVAR     eszopiclone 2 MG tablet  Commonly known as:  LUNESTA  Take 1 tablet by mouth Every Night. Take immediately before bedtime     EVAMIST 1.53 MG/SPRAY transdermal spray  Generic drug:  estradiol     ipratropium 0.06 % nasal spray  Commonly known as:  ATROVENT     progesterone 100 MG capsule  Commonly known as:  PROMETRIUM     rizatriptan 10 MG tablet  Commonly known as:  MAXALT  TAKE 1 TABLET BY MOUTH ONE TIME A DAY AS NEEDED FOR MIGRAINE (max 1 tab in   24 hours)     Sod Picosulfate-Mag Ox-Cit Acd 10-3.5-12 MG-GM -GM/160ML solution  Take 1 kit by mouth Take As Directed. Follow instructions that were mailed   to your home. If you didn't receive these call [phone].     sulfamethoxazole-trimethoprim 400-80 MG tablet  Commonly known as:  BACTRIM,SEPTRA     VAGIFEM 10 MCG tablet vaginal tablet  Generic drug:  estradiol            Comment: Please note this report has been produced using speech recognition software.      Ranjan Torres DO  Attending Emergency Physician               Ranjan Torres DO  02/21/20 8105

## 2020-02-22 ENCOUNTER — ANESTHESIA (OUTPATIENT)
Dept: PERIOP | Facility: HOSPITAL | Age: 55
End: 2020-02-22

## 2020-02-22 ENCOUNTER — ANESTHESIA EVENT (OUTPATIENT)
Dept: PERIOP | Facility: HOSPITAL | Age: 55
End: 2020-02-22

## 2020-02-22 ENCOUNTER — APPOINTMENT (OUTPATIENT)
Dept: GENERAL RADIOLOGY | Facility: HOSPITAL | Age: 55
End: 2020-02-22

## 2020-02-22 LAB
ANION GAP SERPL CALCULATED.3IONS-SCNC: 8 MMOL/L (ref 5–15)
BUN BLD-MCNC: 10 MG/DL (ref 6–20)
BUN/CREAT SERPL: 13.2 (ref 7–25)
CALCIUM SPEC-SCNC: 8.8 MG/DL (ref 8.6–10.5)
CHLORIDE SERPL-SCNC: 109 MMOL/L (ref 98–107)
CO2 SERPL-SCNC: 24 MMOL/L (ref 22–29)
CREAT BLD-MCNC: 0.76 MG/DL (ref 0.57–1)
DEPRECATED RDW RBC AUTO: 43.4 FL (ref 37–54)
ERYTHROCYTE [DISTWIDTH] IN BLOOD BY AUTOMATED COUNT: 11.9 % (ref 12.3–15.4)
GFR SERPL CREATININE-BSD FRML MDRD: 79 ML/MIN/1.73
GLUCOSE BLD-MCNC: 106 MG/DL (ref 65–99)
HCT VFR BLD AUTO: 35.4 % (ref 34–46.6)
HGB BLD-MCNC: 11.5 G/DL (ref 12–15.9)
MCH RBC QN AUTO: 31.9 PG (ref 26.6–33)
MCHC RBC AUTO-ENTMCNC: 32.5 G/DL (ref 31.5–35.7)
MCV RBC AUTO: 98.3 FL (ref 79–97)
PLATELET # BLD AUTO: 175 10*3/MM3 (ref 140–450)
PMV BLD AUTO: 11.7 FL (ref 6–12)
POTASSIUM BLD-SCNC: 3.5 MMOL/L (ref 3.5–5.2)
POTASSIUM BLD-SCNC: 3.6 MMOL/L (ref 3.5–5.2)
RBC # BLD AUTO: 3.6 10*6/MM3 (ref 3.77–5.28)
SODIUM BLD-SCNC: 141 MMOL/L (ref 136–145)
WBC NRBC COR # BLD: 7.88 10*3/MM3 (ref 3.4–10.8)

## 2020-02-22 PROCEDURE — 25010000002 PROMETHAZINE PER 50 MG: Performed by: NURSE ANESTHETIST, CERTIFIED REGISTERED

## 2020-02-22 PROCEDURE — 96376 TX/PRO/DX INJ SAME DRUG ADON: CPT

## 2020-02-22 PROCEDURE — 82365 CALCULUS SPECTROSCOPY: CPT | Performed by: UROLOGY

## 2020-02-22 PROCEDURE — 25010000002 HYDROMORPHONE PER 4 MG: Performed by: NURSE PRACTITIONER

## 2020-02-22 PROCEDURE — 84132 ASSAY OF SERUM POTASSIUM: CPT | Performed by: UROLOGY

## 2020-02-22 PROCEDURE — C2617 STENT, NON-COR, TEM W/O DEL: HCPCS | Performed by: UROLOGY

## 2020-02-22 PROCEDURE — 25010000002 DEXAMETHASONE PER 1 MG: Performed by: NURSE ANESTHETIST, CERTIFIED REGISTERED

## 2020-02-22 PROCEDURE — 25010000002 ONDANSETRON PER 1 MG: Performed by: NURSE PRACTITIONER

## 2020-02-22 PROCEDURE — C1758 CATHETER, URETERAL: HCPCS | Performed by: UROLOGY

## 2020-02-22 PROCEDURE — G0378 HOSPITAL OBSERVATION PER HR: HCPCS

## 2020-02-22 PROCEDURE — 25010000002 HYDROMORPHONE PER 4 MG: Performed by: UROLOGY

## 2020-02-22 PROCEDURE — 85027 COMPLETE CBC AUTOMATED: CPT | Performed by: NURSE PRACTITIONER

## 2020-02-22 PROCEDURE — 76000 FLUOROSCOPY <1 HR PHYS/QHP: CPT

## 2020-02-22 PROCEDURE — 25010000002 ONDANSETRON PER 1 MG: Performed by: NURSE ANESTHETIST, CERTIFIED REGISTERED

## 2020-02-22 PROCEDURE — 25010000002 PHENYLEPHRINE PER 1 ML: Performed by: NURSE ANESTHETIST, CERTIFIED REGISTERED

## 2020-02-22 PROCEDURE — 25010000002 PROPOFOL 10 MG/ML EMULSION: Performed by: NURSE ANESTHETIST, CERTIFIED REGISTERED

## 2020-02-22 PROCEDURE — C1769 GUIDE WIRE: HCPCS | Performed by: UROLOGY

## 2020-02-22 PROCEDURE — 25010000002 SUCCINYLCHOLINE PER 20 MG: Performed by: NURSE ANESTHETIST, CERTIFIED REGISTERED

## 2020-02-22 PROCEDURE — 99225 PR SBSQ OBSERVATION CARE/DAY 25 MINUTES: CPT | Performed by: NURSE PRACTITIONER

## 2020-02-22 PROCEDURE — 25010000003 CEFAZOLIN PER 500 MG: Performed by: NURSE ANESTHETIST, CERTIFIED REGISTERED

## 2020-02-22 PROCEDURE — 80048 BASIC METABOLIC PNL TOTAL CA: CPT | Performed by: NURSE PRACTITIONER

## 2020-02-22 DEVICE — URETERAL STENT
Type: IMPLANTABLE DEVICE | Status: FUNCTIONAL
Brand: PERCUFLEX™ PLUS

## 2020-02-22 RX ORDER — SUCCINYLCHOLINE CHLORIDE 20 MG/ML
INJECTION INTRAMUSCULAR; INTRAVENOUS AS NEEDED
Status: DISCONTINUED | OUTPATIENT
Start: 2020-02-22 | End: 2020-02-22 | Stop reason: SURG

## 2020-02-22 RX ORDER — PROMETHAZINE HYDROCHLORIDE 25 MG/1
25 TABLET ORAL ONCE AS NEEDED
Status: COMPLETED | OUTPATIENT
Start: 2020-02-22 | End: 2020-02-22

## 2020-02-22 RX ORDER — MAGNESIUM HYDROXIDE 1200 MG/15ML
LIQUID ORAL AS NEEDED
Status: DISCONTINUED | OUTPATIENT
Start: 2020-02-22 | End: 2020-02-22 | Stop reason: HOSPADM

## 2020-02-22 RX ORDER — LIDOCAINE HYDROCHLORIDE 10 MG/ML
0.5 INJECTION, SOLUTION EPIDURAL; INFILTRATION; INTRACAUDAL; PERINEURAL ONCE AS NEEDED
Status: DISCONTINUED | OUTPATIENT
Start: 2020-02-22 | End: 2020-02-22 | Stop reason: HOSPADM

## 2020-02-22 RX ORDER — LIDOCAINE HYDROCHLORIDE 10 MG/ML
INJECTION, SOLUTION EPIDURAL; INFILTRATION; INTRACAUDAL; PERINEURAL AS NEEDED
Status: DISCONTINUED | OUTPATIENT
Start: 2020-02-22 | End: 2020-02-22 | Stop reason: SURG

## 2020-02-22 RX ORDER — FENTANYL CITRATE 50 UG/ML
50 INJECTION, SOLUTION INTRAMUSCULAR; INTRAVENOUS
Status: DISCONTINUED | OUTPATIENT
Start: 2020-02-22 | End: 2020-02-22 | Stop reason: HOSPADM

## 2020-02-22 RX ORDER — CEFAZOLIN SODIUM 1 G/3ML
INJECTION, POWDER, FOR SOLUTION INTRAMUSCULAR; INTRAVENOUS AS NEEDED
Status: DISCONTINUED | OUTPATIENT
Start: 2020-02-22 | End: 2020-02-22 | Stop reason: SURG

## 2020-02-22 RX ORDER — FAMOTIDINE 10 MG/ML
20 INJECTION, SOLUTION INTRAVENOUS ONCE
Status: DISCONTINUED | OUTPATIENT
Start: 2020-02-22 | End: 2020-02-22 | Stop reason: HOSPADM

## 2020-02-22 RX ORDER — DEXAMETHASONE SODIUM PHOSPHATE 4 MG/ML
INJECTION, SOLUTION INTRA-ARTICULAR; INTRALESIONAL; INTRAMUSCULAR; INTRAVENOUS; SOFT TISSUE AS NEEDED
Status: DISCONTINUED | OUTPATIENT
Start: 2020-02-22 | End: 2020-02-22 | Stop reason: SURG

## 2020-02-22 RX ORDER — ONDANSETRON 2 MG/ML
INJECTION INTRAMUSCULAR; INTRAVENOUS AS NEEDED
Status: DISCONTINUED | OUTPATIENT
Start: 2020-02-22 | End: 2020-02-22 | Stop reason: SURG

## 2020-02-22 RX ORDER — SODIUM CHLORIDE 0.9 % (FLUSH) 0.9 %
10 SYRINGE (ML) INJECTION AS NEEDED
Status: DISCONTINUED | OUTPATIENT
Start: 2020-02-22 | End: 2020-02-22 | Stop reason: HOSPADM

## 2020-02-22 RX ORDER — SODIUM CHLORIDE, SODIUM LACTATE, POTASSIUM CHLORIDE, CALCIUM CHLORIDE 600; 310; 30; 20 MG/100ML; MG/100ML; MG/100ML; MG/100ML
INJECTION, SOLUTION INTRAVENOUS CONTINUOUS PRN
Status: DISCONTINUED | OUTPATIENT
Start: 2020-02-22 | End: 2020-02-22 | Stop reason: SURG

## 2020-02-22 RX ORDER — PROMETHAZINE HYDROCHLORIDE 25 MG/ML
6.25 INJECTION, SOLUTION INTRAMUSCULAR; INTRAVENOUS ONCE AS NEEDED
Status: COMPLETED | OUTPATIENT
Start: 2020-02-22 | End: 2020-02-22

## 2020-02-22 RX ORDER — ROCURONIUM BROMIDE 10 MG/ML
INJECTION, SOLUTION INTRAVENOUS AS NEEDED
Status: DISCONTINUED | OUTPATIENT
Start: 2020-02-22 | End: 2020-02-22 | Stop reason: SURG

## 2020-02-22 RX ORDER — PROMETHAZINE HYDROCHLORIDE 25 MG/1
25 SUPPOSITORY RECTAL ONCE AS NEEDED
Status: COMPLETED | OUTPATIENT
Start: 2020-02-22 | End: 2020-02-22

## 2020-02-22 RX ORDER — SODIUM CHLORIDE 0.9 % (FLUSH) 0.9 %
10 SYRINGE (ML) INJECTION EVERY 12 HOURS SCHEDULED
Status: DISCONTINUED | OUTPATIENT
Start: 2020-02-22 | End: 2020-02-22 | Stop reason: HOSPADM

## 2020-02-22 RX ORDER — PROPOFOL 10 MG/ML
VIAL (ML) INTRAVENOUS AS NEEDED
Status: DISCONTINUED | OUTPATIENT
Start: 2020-02-22 | End: 2020-02-22 | Stop reason: SURG

## 2020-02-22 RX ORDER — LEVOFLOXACIN 5 MG/ML
500 INJECTION, SOLUTION INTRAVENOUS
Status: DISPENSED | OUTPATIENT
Start: 2020-02-22 | End: 2020-02-22

## 2020-02-22 RX ORDER — FAMOTIDINE 20 MG/1
20 TABLET, FILM COATED ORAL ONCE
Status: DISCONTINUED | OUTPATIENT
Start: 2020-02-22 | End: 2020-02-22 | Stop reason: HOSPADM

## 2020-02-22 RX ORDER — SODIUM CHLORIDE, SODIUM LACTATE, POTASSIUM CHLORIDE, CALCIUM CHLORIDE 600; 310; 30; 20 MG/100ML; MG/100ML; MG/100ML; MG/100ML
9 INJECTION, SOLUTION INTRAVENOUS CONTINUOUS
Status: DISCONTINUED | OUTPATIENT
Start: 2020-02-22 | End: 2020-02-23

## 2020-02-22 RX ADMIN — PROPOFOL 200 MG: 10 INJECTION, EMULSION INTRAVENOUS at 16:41

## 2020-02-22 RX ADMIN — SODIUM CHLORIDE, POTASSIUM CHLORIDE, SODIUM LACTATE AND CALCIUM CHLORIDE: 600; 310; 30; 20 INJECTION, SOLUTION INTRAVENOUS at 16:38

## 2020-02-22 RX ADMIN — Medication 10 ML: at 09:51

## 2020-02-22 RX ADMIN — SODIUM CHLORIDE 100 ML/HR: 9 INJECTION, SOLUTION INTRAVENOUS at 02:35

## 2020-02-22 RX ADMIN — LIDOCAINE HYDROCHLORIDE 50 MG: 10 INJECTION, SOLUTION EPIDURAL; INFILTRATION; INTRACAUDAL; PERINEURAL at 16:41

## 2020-02-22 RX ADMIN — SUCCINYLCHOLINE CHLORIDE 140 MG: 20 INJECTION, SOLUTION INTRAMUSCULAR; INTRAVENOUS; PARENTERAL at 16:41

## 2020-02-22 RX ADMIN — HYDROMORPHONE HYDROCHLORIDE 0.5 MG: 1 INJECTION, SOLUTION INTRAMUSCULAR; INTRAVENOUS; SUBCUTANEOUS at 10:59

## 2020-02-22 RX ADMIN — SENNOSIDES AND DOCUSATE SODIUM 2 TABLET: 8.6; 5 TABLET ORAL at 09:05

## 2020-02-22 RX ADMIN — PROGESTERONE 200 MG: 100 CAPSULE ORAL at 20:39

## 2020-02-22 RX ADMIN — OXYCODONE HYDROCHLORIDE AND ACETAMINOPHEN 1 TABLET: 5; 325 TABLET ORAL at 02:34

## 2020-02-22 RX ADMIN — SENNOSIDES AND DOCUSATE SODIUM 2 TABLET: 8.6; 5 TABLET ORAL at 20:38

## 2020-02-22 RX ADMIN — PROMETHAZINE HYDROCHLORIDE 6.25 MG: 25 INJECTION INTRAMUSCULAR; INTRAVENOUS at 17:25

## 2020-02-22 RX ADMIN — HYDROMORPHONE HYDROCHLORIDE 0.5 MG: 1 INJECTION, SOLUTION INTRAMUSCULAR; INTRAVENOUS; SUBCUTANEOUS at 18:25

## 2020-02-22 RX ADMIN — CEFAZOLIN SODIUM 2 G: 1 INJECTION, POWDER, FOR SOLUTION INTRAMUSCULAR; INTRAVENOUS at 16:45

## 2020-02-22 RX ADMIN — ONDANSETRON 4 MG: 2 INJECTION INTRAMUSCULAR; INTRAVENOUS at 10:59

## 2020-02-22 RX ADMIN — ONDANSETRON 4 MG: 2 INJECTION INTRAMUSCULAR; INTRAVENOUS at 16:52

## 2020-02-22 RX ADMIN — IPRATROPIUM BROMIDE 1 SPRAY: 42 SPRAY NASAL at 20:40

## 2020-02-22 RX ADMIN — SODIUM CHLORIDE 100 ML/HR: 9 INJECTION, SOLUTION INTRAVENOUS at 10:59

## 2020-02-22 RX ADMIN — DEXAMETHASONE SODIUM PHOSPHATE 8 MG: 4 INJECTION, SOLUTION INTRAMUSCULAR; INTRAVENOUS at 16:47

## 2020-02-22 RX ADMIN — TAMSULOSIN HYDROCHLORIDE 0.4 MG: 0.4 CAPSULE ORAL at 20:39

## 2020-02-22 RX ADMIN — PHENYLEPHRINE HYDROCHLORIDE 100 MCG: 10 INJECTION, SOLUTION INTRAMUSCULAR; INTRAVENOUS; SUBCUTANEOUS at 16:59

## 2020-02-22 RX ADMIN — ROCURONIUM BROMIDE 5 MG: 10 SOLUTION INTRAVENOUS at 16:41

## 2020-02-22 NOTE — ANESTHESIA PROCEDURE NOTES
Airway  Urgency: elective    Date/Time: 2/22/2020 4:42 PM  Airway not difficult    General Information and Staff    Patient location during procedure: OR  CRNA: Karthikeyan Sanford CRNA    Indications and Patient Condition  Indications for airway management: airway protection    Preoxygenated: yes  MILS not maintained throughout  Mask difficulty assessment: 0 - not attempted    Final Airway Details  Final airway type: endotracheal airway      Successful airway: ETT  Cuffed: yes   Successful intubation technique: direct laryngoscopy and RSI  Facilitating devices/methods: cricoid pressure and intubating stylet  Endotracheal tube insertion site: oral  Blade: Guerrero  Blade size: 2  ETT size (mm): 7.0  Cormack-Lehane Classification: grade I - full view of glottis  Placement verified by: chest auscultation and capnometry   Cuff volume (mL): 5  Measured from: lips  ETT/EBT  to lips (cm): 21  Number of attempts at approach: 1  Assessment: lips, teeth, and gum same as pre-op and atraumatic intubation    Additional Comments  Negative epigastric sounds, Breath sound equal bilaterally with symmetric chest rise and fall

## 2020-02-22 NOTE — PROGRESS NOTES
Urology Progress Note     LOS: 0 days   Patient Care Team:  Ji Montaño MD as PCP - General (Internal Medicine)    Chief Complaint:    Chief Complaint   Patient presents with   • Flank Pain       Subjective     Interval History:     She continues to have episodes of renal colic.  She is interested in ureteroscopic stone extraction and stent placement.      Objective     Vital Signs  Temp:  [97.6 °F (36.4 °C)-98.4 °F (36.9 °C)] 98.4 °F (36.9 °C)  Heart Rate:  [76-95] 87  Resp:  [18] 18  BP: ()/(51-68) 116/68  I/O last 3 completed shifts:  In: 1999 [I.V.:999; IV Piggyback:1000]  Out: 800 [Urine:800]  No intake/output data recorded.    Physical Exam:   General Appearance: alert, appears stated age and cooperative  Abdomen: soft non-tender and no guarding     Results Review:     I reviewed the patient's new clinical results.  Lab Results (last 24 hours)     Procedure Component Value Units Date/Time    Basic Metabolic Panel [[id number]]  (Abnormal) Collected:  02/22/20 0613    Specimen:  Blood Updated:  02/22/20 0715     Glucose 106 mg/dL      BUN 10 mg/dL      Creatinine 0.76 mg/dL      Sodium 141 mmol/L      Potassium 3.6 mmol/L      Chloride 109 mmol/L      CO2 24.0 mmol/L      Calcium 8.8 mg/dL      eGFR Non African Amer 79 mL/min/1.73      BUN/Creatinine Ratio 13.2     Anion Gap 8.0 mmol/L     Narrative:       GFR Normal >60  Chronic Kidney Disease <60  Kidney Failure <15      CBC (No Diff) [[id number]]  (Abnormal) Collected:  02/22/20 0613    Specimen:  Blood Updated:  02/22/20 0652     WBC 7.88 10*3/mm3      RBC 3.60 10*6/mm3      Hemoglobin 11.5 g/dL      Hematocrit 35.4 %      MCV 98.3 fL      MCH 31.9 pg      MCHC 32.5 g/dL      RDW 11.9 %      RDW-SD 43.4 fl      MPV 11.7 fL      Platelets 175 10*3/mm3     Hemoglobin A1c [[id number]]  (Normal) Collected:  02/21/20 1129    Specimen:  Blood Updated:  02/21/20 1528     Hemoglobin A1C 5.20 %     Narrative:       Hemoglobin A1C Ranges:    Increased Risk for  Diabetes  5.7% to 6.4%  Diabetes                     >= 6.5%  Diabetic Goal                < 7.0%    Urinalysis With Microscopic If Indicated (No Culture) - Urine, Clean Catch [[id number]]  (Abnormal) Collected:  02/21/20 1129    Specimen:  Urine, Clean Catch Updated:  02/21/20 1200     Color, UA Yellow     Appearance, UA Clear     pH, UA <=5.0     Specific Gravity, UA 1.015     Glucose,  mg/dL (2+)     Ketones, UA Negative     Bilirubin, UA Negative     Blood, UA Trace     Protein, UA Negative     Leuk Esterase, UA Negative     Nitrite, UA Negative     Urobilinogen, UA 0.2 E.U./dL    Urinalysis, Microscopic Only - Urine, Clean Catch [[id number]] Collected:  02/21/20 1129    Specimen:  Urine, Clean Catch Updated:  02/21/20 1200     RBC, UA 0-2 /HPF      WBC, UA None Seen /HPF      Bacteria, UA None Seen /HPF      Squamous Epithelial Cells, UA None Seen /HPF      Hyaline Casts, UA 0-6 /LPF      Methodology Automated Microscopy    CBC & Differential [[id number]] Collected:  02/21/20 1129    Specimen:  Blood Updated:  02/21/20 1159    Narrative:       The following orders were created for panel order CBC & Differential.  Procedure                               Abnormality         Status                     ---------                               -----------         ------                     CBC Auto Differential[[id number]]        Abnormal            Final result                 Please view results for these tests on the individual orders.    CBC Auto Differential [[id number]]  (Abnormal) Collected:  02/21/20 1129    Specimen:  Blood Updated:  02/21/20 1159     WBC 7.88 10*3/mm3      RBC 4.10 10*6/mm3      Hemoglobin 12.7 g/dL      Hematocrit 40.1 %      MCV 97.8 fL      MCH 31.0 pg      MCHC 31.7 g/dL      RDW 11.7 %      RDW-SD 42.1 fl      MPV 11.6 fL      Platelets 189 10*3/mm3      Neutrophil % 93.2 %      Lymphocyte % 4.2 %      Monocyte % 1.9 %      Eosinophil % 0.0 %      Basophil % 0.3 %      Immature Grans  % 0.4 %      Neutrophils, Absolute 7.35 10*3/mm3      Lymphocytes, Absolute 0.33 10*3/mm3      Monocytes, Absolute 0.15 10*3/mm3      Eosinophils, Absolute 0.00 10*3/mm3      Basophils, Absolute 0.02 10*3/mm3      Immature Grans, Absolute 0.03 10*3/mm3      nRBC 0.0 /100 WBC           Medication Review:   Current Facility-Administered Medications   Medication Dose Route Frequency Provider Last Rate Last Dose   • acetaminophen (TYLENOL) tablet 650 mg  650 mg Oral Q4H PRN Rain Edmonds APRN        Or   • acetaminophen (TYLENOL) 160 MG/5ML solution 650 mg  650 mg Oral Q4H PRN Rain Edmonds APRN        Or   • acetaminophen (TYLENOL) suppository 650 mg  650 mg Rectal Q4H PRN Rain Edmonds APRN       • calcium carbonate EX (TUMS EX) chewable tablet 750 mg  750 mg Oral BID PRN Rain Edmonds APRN       • dextrose (D50W) 25 g/ 50mL Intravenous Solution 25 g  25 g Intravenous Q15 Min PRN Rain Edmonds APRN       • dextrose (GLUTOSE) oral gel 15 g  15 g Oral Q15 Min PRN Rain Edmonds APRN       • glucagon (human recombinant) (GLUCAGEN DIAGNOSTIC) injection 1 mg  1 mg Subcutaneous Q15 Min PRN Rain Edmonds APRN       • HYDROmorphone (DILAUDID) injection 0.5 mg  0.5 mg Intravenous Q4H PRN Rain Edmonds APRN   0.5 mg at 02/22/20 1059    And   • naloxone (NARCAN) injection 0.4 mg  0.4 mg Intravenous Q5 Min PRN Rain Edmonds APRN       • ipratropium (ATROVENT) nasal spray 0.06%  1 spray Each Nare 4x Daily - RT Rain Edmonds APRN   1 spray at 02/21/20 2022   • ketotifen (ZADITOR) 0.025 % ophthalmic solution 1 drop  1 drop Both Eyes BID Rain Edmonds APRN       • levoFLOXacin (LEVAQUIN) 500 mg/100 mL D5W (premix) (LEVAQUIN) 500 mg  500 mg Intravenous Once When Specified Carlito Davis MD       • Magnesium Sulfate 2 gram Bolus, followed by 8 gram infusion (total Mg dose 10 grams)- Mg less than or equal to 1mg/dL  2 g Intravenous PRN Rain Edmonds, JUAN        Or   • Magnesium Sulfate 2 gram / 50mL Infusion (GIVE X 3  BAGS TO EQUAL 6GM TOTAL DOSE) - Mg 1.1 - 1.5 mg/dl  2 g Intravenous PRN Rain Edmonds APRN        Or   • Magnesium Sulfate 4 gram infusion- Mg 1.6-1.9 mg/dL  4 g Intravenous PRN Rain Edmonds APRN       • methocarbamol (ROBAXIN) tablet 500 mg  500 mg Oral 4x Daily PRN Rain Edmonds APRN       • ondansetron (ZOFRAN) tablet 4 mg  4 mg Oral Q6H PRN Rain Edmonds APRN        Or   • ondansetron (ZOFRAN) injection 4 mg  4 mg Intravenous Q6H PRN Rain Edmonds APRN   4 mg at 02/22/20 1059   • oxyCODONE-acetaminophen (PERCOCET) 5-325 MG per tablet 1 tablet  1 tablet Oral Q4H PRN Rain Edmonds APRN   1 tablet at 02/22/20 0234   • potassium chloride (MICRO-K) CR capsule 40 mEq  40 mEq Oral PRN Rain Edmonds APRN        Or   • potassium chloride (KLOR-CON) packet 40 mEq  40 mEq Oral PRN Rain Edmonds APRN        Or   • potassium chloride 10 mEq in 100 mL IVPB  10 mEq Intravenous Q1H PRN Rain Emdonds APRN       • prochlorperazine (COMPAZINE) suppository 25 mg  25 mg Rectal Q12H PRN Rain Edmonds APRN       • progesterone (PROMETRIUM) capsule 200 mg  200 mg Oral Nightly Rain Edmonds APRN   200 mg at 02/21/20 2022   • sennosides-docusate (PERICOLACE) 8.6-50 MG per tablet 2 tablet  2 tablet Oral BID Rain Edmonds APRN   2 tablet at 02/22/20 0905   • sodium chloride 0.9 % flush 10 mL  10 mL Intravenous PRN Rain Edmonds APRN       • sodium chloride 0.9 % flush 10 mL  10 mL Intravenous Q12H Rain Edmonds APRN   10 mL at 02/22/20 0951   • sodium chloride 0.9 % flush 10 mL  10 mL Intravenous PRN Rain Edmonds APRN       • sodium chloride 0.9 % infusion  100 mL/hr Intravenous Continuous Rain Edmonds APRN 100 mL/hr at 02/22/20 1059 100 mL/hr at 02/22/20 1059   • tamsulosin (FLOMAX) 24 hr capsule 0.4 mg  0.4 mg Oral Nightly Rain Edmonds APRN   0.4 mg at 02/21/20 2022   • zolpidem (AMBIEN) tablet 2.5 mg  2.5 mg Oral Nightly Rain Edmonds APRN             Assessment/Plan       Renal colic on left side    Continue  tamsulosin  Strain urine   She is scheduled for ureteroscopic stone extraction later this afternoon        Carlito Davis MD  02/22/20  11:36 AM

## 2020-02-22 NOTE — OP NOTE
CYSTOSCOPY URETEROSCOPY STONE EXTRACTION STENT INSERTION  Progress Note    Britni Raphael  2/22/2020    Pre-op Diagnosis:   Left ureteral stone       Post-Op Diagnosis Codes:  Left ureteral stone    Procedure/CPT® Codes:    Procedure(s):  CYSTOSCOPY LEFT URETEROSCOPY STONE EXTRACTION STENT INSERTION    Surgeon(s):  Carlito Davis MD    Anesthesia: General    Staff:   Circulator: Philomena Quintero RN  Laser Staff: Tahira Davila  Scrub Person: Neda Romero    Estimated Blood Loss: none    Urine Voided: * No values recorded between 2/22/2020 12:00 AM and 2/22/2020  5:00 PM *    Specimens:                A: Ureteral stone     Drains: * No LDAs found *    Findings: Left distal ureteral stone    Complications: None    Procedure:  Patient was correctly identified in preoperative holding area.  Informed consent is obtained after all risks and benefits were reviewed with patient.  She is taken to the operating room positioned in supine position.  Anesthesia induced.  All appropriate lines and monitors were placed.  She was repositioned to lithotomy position.  All pressure points padded.  Proper timeout procedure completed.  Preoperative antibiotics have been given.  The genitourinary area is prepped and draped in normal sterile fashion.  Rigid cystoscopy performed.  No foreign bodies, masses, lesions were identified.  The left ureteral orifice was identified and cannulated with zip wire.  The wire was advanced to the renal pelvis using fluoroscopy.  The scope was removed leaving the wire in place rigid ureteroscopy was performed alongside the wire.  Stone visualized in the distal ureter.  It was able to be grasped and removed intact.  Repeat ureteroscopy revealed no residual stones in the distal to mid ureter.  A 4.8 Qatari by 24 cm stent was advanced over the wire in retrograde fashion with good coil seen proximally distally by fluoroscopy and cystoscopy respectively.  The string of the stent was  adhered to the patient's inner thigh using Tegaderm.  Lidocaine jelly was instilled for local analgesia    Disposition:  Patient is taken to the recovery room in stable condition.  She is taken back to floor bed in stable condition.  She will follow-up in 5 days for stent removal.    Carlito Davis MD     Date: 2/22/2020  Time: 5:03 PM

## 2020-02-22 NOTE — ANESTHESIA POSTPROCEDURE EVALUATION
Patient: Britni Raphael    Procedure Summary     Date:  02/22/20 Room / Location:   [facility] /  [facility]    Anesthesia Start:  1638 Anesthesia Stop:      Procedure:  CYSTOSCOPY URETEROSCOPY RETROGRADE PYELOGRAM STONE EXTRACTION STENT INSERTION (Left ) Diagnosis:      Surgeon:  Carlito Davis MD Provider:  Giselle Thompson MD    Anesthesia Type:  general ASA Status:  2          Anesthesia Type: general    Vitals  No vitals data found for the desired time range.          Post Anesthesia Care and Evaluation    Patient location during evaluation: PACU  Patient participation: complete - patient participated  Level of consciousness: awake and awake and alert  Pain score: 0  Pain management: adequate  Airway patency: patent  Anesthetic complications: No anesthetic complications  PONV Status: none  Cardiovascular status: acceptable and stable  Respiratory status: nasal cannula, unassisted, acceptable and spontaneous ventilation  Hydration status: acceptable

## 2020-02-22 NOTE — BRIEF OP NOTE
CYSTOSCOPY URETEROSCOPY RETROGRADE PYELOGRAM STONE EXTRACTION STENT INSERTION  Progress Note    Britni Raphael  2/22/2020    Pre-op Diagnosis:   Left ureteral stone       Post-Op Diagnosis Codes:  Left ureteral stone    Procedure/CPT® Codes:      Procedure(s):  CYSTOSCOPY URETEROSCOPY STONE EXTRACTION STENT INSERTION    Surgeon(s):  Carlito Davis MD    Anesthesia: General    Staff:   Circulator: Philomena Quintero RN  Laser Staff: Tahira Davila  Scrub Person: Neda Romero    Estimated Blood Loss: none    Urine Voided: * No values recorded between 2/22/2020 12:00 AM and 2/22/2020  4:42 PM *    Specimens:                A: Ureteral stone     Drains: * No LDAs found *    Findings: Left distal ureteral stone    Complications: None      Carlito Davis MD     Date: 2/22/2020  Time: 4:42 PM

## 2020-02-22 NOTE — PROGRESS NOTES
[facility] Medicine Services  Clinical Decision Unit  PROGRESS NOTE    Patient Name: Britni Raphael  : 1965  MRN: [id number]    Admission Date and Time: 2020 10:37 AM  Primary Care Physician: Ji Montaño MD    Subjective   Subjective   CC:  Left flank pain, N/V    HPI:  Patient lying on her left side in bed in NAD.  Patient states she is feeling good and ready to go home, but states in the early morning hours, she began to have pain going down her left leg.  Patient still having CVA tenderness.  Patient strained her urine every time she is gone, but has had no stone.  Waiting for Dr. Davis's recommendation.  No BM.  No family in the room.    Review of Systems  Gen- No fevers, chills  CV- No chest pain, palpitations  Resp- No cough, dyspnea  GI- No N/V/D, abd pain  - +flank pain  All other systems were reviewed and the pertinent positives and negatives are listed above in the HPI or ROS    Objective   Objective   Vital Signs:   Temp:  [97.6 °F (36.4 °C)-98.4 °F (36.9 °C)] 98.4 °F (36.9 °C)  Heart Rate:  [76-95] 87  Resp:  [18] 18  BP: ()/(51-68) 116/68  Physical Exam:  Constitutional: Alert, WD, WN male in NAD  Eyes: EOMI, sclerae anicteric, no conjunctival injection  Head: NCAT  ENT: Berry, moist mucous membranes   Respiratory: Non-labored, symmetrical chest expansion, CTAB  Cardiovascular: RRR, no M/R/G, +DP pulses bilaterally  Gastrointestinal: Soft, NT, ND +BS  ; +left CVA tenderness  Musculoskeletal: CROSS; no LE edema bilaterally  Neurologic: Oriented x4, strength symmetric in all extremities, follows all commands, speech clear  Skin: No rashes on exposed skin  Psychiatric: Pleasant and cooperative; normal affect    Results Reviewed:  Gluc 106  WBC 7.88  Hgb 11.5  A1c 5.2    Assessment/Plan   Assessment / Plan     Active Hospital Problems    Diagnosis  POA   • **Renal colic on left side [N23]  Yes      Resolved Hospital Problems   No resolved problems to  display.     Brief course to date:  Britni Raphael is a 54 y.o. female with PMH significant for GERD, migraine, hypothyroidism, kidney stones presented to [facility] ED at 0 200 this morning with left flank pain and was diagnosed with a 3.5 mm left sided kidney stone with hydronephrosis.  Patient came back to the ED at 10:30AM with nausea and vomiting and left flank pain.  Labs were redrawn and WBC dropped from from 13.04 at 2 AM to 7.88 at 11:30 AM today.    Plan:  Nephrolithiasis  --IV fluids  --IV pain medication; change to oral as tolerated  --PO Flomax  --Dr Kim aware but wants to try to have her pass stone (3.5mm); if  Not passed, will place stent in the morning  --Dr Davis into see patient this morning; awaiting recommendations     Hyperglycemia  --A1c 5.2  --[facility] glucose checks stopped d/t normal glucose and patient request  --SSI  --Consistent carb diet    Discharge Blueprint:   Pain Free  Urology Clearance    Electronically signed by JUAN Lama, 02/22/20, 11:11 AM.

## 2020-02-22 NOTE — PLAN OF CARE
Pt reports pain, IV dilaudid given, VSS, pt reports numerous trips to the bathroom without obvious stone in her strainer. Pt signed consent and requesting to not receive IV levaquin due to achilles tendon problems. Pt denies nausea or vomiting. Pt to go to OR for left ureter stent placement today with Dr. Gerber.

## 2020-02-22 NOTE — ANESTHESIA PREPROCEDURE EVALUATION
Anesthesia Evaluation     Patient summary reviewed and Nursing notes reviewed   history of anesthetic complications: PONV  NPO Solid Status: > 8 hours  NPO Liquid Status: > 8 hours           Airway   Mallampati: II  TM distance: >3 FB  Neck ROM: full  No difficulty expected  Dental - normal exam     Pulmonary - negative pulmonary ROS and normal exam   Cardiovascular - normal exam    ECG reviewed        Neuro/Psych  (+) headaches,     GI/Hepatic/Renal/Endo    (+)  GERD well controlled,  renal disease stones, thyroid problem     Musculoskeletal     (+) back pain,   Abdominal    Substance History      OB/GYN          Other   arthritis,                    Anesthesia Plan    ASA 2     general     intravenous induction     Anesthetic plan, all risks, benefits, and alternatives have been provided, discussed and informed consent has been obtained with: patient.    Plan discussed with CRNA.

## 2020-02-22 NOTE — PLAN OF CARE
Pt complained of back pain and headache. Gave Percocet. Reports side pain has subsided. Has been straining urine. Reports she has not seen a stone. VS stable. Will continue to monitor.

## 2020-02-23 VITALS
RESPIRATION RATE: 18 BRPM | HEIGHT: 65 IN | TEMPERATURE: 98.3 F | WEIGHT: 157.9 LBS | BODY MASS INDEX: 26.31 KG/M2 | SYSTOLIC BLOOD PRESSURE: 119 MMHG | HEART RATE: 88 BPM | DIASTOLIC BLOOD PRESSURE: 68 MMHG | OXYGEN SATURATION: 96 %

## 2020-02-23 PROCEDURE — 99217 PR OBSERVATION CARE DISCHARGE MANAGEMENT: CPT | Performed by: NURSE PRACTITIONER

## 2020-02-23 PROCEDURE — G0378 HOSPITAL OBSERVATION PER HR: HCPCS

## 2020-02-23 RX ORDER — AMOXICILLIN 250 MG
2 CAPSULE ORAL 2 TIMES DAILY
Start: 2020-02-23 | End: 2020-05-07

## 2020-02-23 RX ADMIN — SENNOSIDES AND DOCUSATE SODIUM 2 TABLET: 8.6; 5 TABLET ORAL at 08:10

## 2020-02-23 RX ADMIN — IPRATROPIUM BROMIDE 1 SPRAY: 42 SPRAY NASAL at 08:11

## 2020-02-23 RX ADMIN — SODIUM CHLORIDE 100 ML/HR: 9 INJECTION, SOLUTION INTRAVENOUS at 01:55

## 2020-02-23 NOTE — PLAN OF CARE
Pt resting comfortably. C/O discomfort at the urethra r/t pulling the stent out. Has not requested pain med this shift. Nurse will continue to monitor. Pt wants to go home today.

## 2020-02-23 NOTE — DISCHARGE SUMMARY
[facility] Medicine Services  Clinical Decision Unit  DISCHARGE SUMMARY    Patient Name: Britni Raphael  : 1965  MRN: [id number]    Admission Date and Time: 2020 10:37 AM  Discharge Date and Time:  20    Primary Care Physician: Ji Montaño MD    Hospital Course     Active Hospital Problems    Diagnosis  POA   • **Renal colic on left side [N23]  Yes      Resolved Hospital Problems   No resolved problems to display.          Brief CDU Course:  Britni Raphael is a 54 y.o. female with history of kidney stones who presented to the ED with left flank pain and was found to have a kidney stone with hydronephrosis.  She was admitted to the hospitalist service for further treatment.  She was unsuccessful at passing the stone on her own and was taken for stone extraction and stent placement on 20.  The removed the stent on her own shortly after being placed.  She remained stable overnight.  She is urinating without difficulty and tolerating po.  She is stable and ready for dc home with follow up with  as scheduled.        Key Discharge Recommendations:  Dr. Kim as scheduled    Discharge Evaluation     Vital Signs:   Temp:  [98.1 °F (36.7 °C)-99.3 °F (37.4 °C)] 98.3 °F (36.8 °C)  Heart Rate:  [] 88  Resp:  [14-18] 18  BP: (107-122)/(64-70) 119/68     Physical Exam:  Constitutional: No acute distress, awake, alert, sitting up in chair asking about dc time  HENT: NCAT, mucous membranes moist  Respiratory: Clear to auscultation bilaterally, respiratory effort normal   Cardiovascular: RRR, no murmurs, rubs, or gallops, palpable pedal pulses bilaterally  Gastrointestinal: Positive bowel sounds, soft, nontender, nondistended  Musculoskeletal: No bilateral ankle edema  Psychiatric: Appropriate affect, cooperative  Neurologic: Oriented x 3, CROSS, speech clear  Skin: No rashes      Pertinent  and/or Most Recent Results     Results from last 7 days   Lab Units  02/22/20  1820 02/22/20  0613 02/21/20  1129 02/21/20  0257   WBC 10*3/mm3  --  7.88 7.88 13.04*   HEMOGLOBIN g/dL  --  11.5* 12.7 13.5   HEMATOCRIT %  --  35.4 40.1 41.6   PLATELETS 10*3/mm3  --  175 189 227   SODIUM mmol/L  --  141  --  141   POTASSIUM mmol/L 3.5 3.6  --  4.1   CHLORIDE mmol/L  --  109*  --  101   CO2 mmol/L  --  24.0  --  23.0   BUN mg/dL  --  10  --  31*   CREATININE mg/dL  --  0.76  --  1.05*   GLUCOSE mg/dL  --  106*  --  151*   CALCIUM mg/dL  --  8.8  --  9.7     Results from last 7 days   Lab Units 02/21/20  0257   BILIRUBIN mg/dL 0.6   ALK PHOS U/L 67   ALT (SGPT) U/L 12   AST (SGOT) U/L 20       Results from last 7 days   Lab Units 02/21/20  1129   HEMOGLOBIN A1C % 5.20       Brief Urine Lab Results  (Last result in the past 365 days)      Color   Clarity   Blood   Leuk Est   Nitrite   Protein   CREAT   Urine HCG        02/21/20 1129 Yellow Clear Trace Negative Negative Negative               Microbiology Results Abnormal     None          Imaging Results (All)     Procedure Component Value Units Date/Time    FL C Arm During Surgery [[id number]] Collected:  02/22/20 1928     Updated:  02/23/20 1129    Narrative:       EXAMINATION: FL C ARM DURING SURGERY - 02/22/2020     INDICATION:  N23-Unspecified renal colic; N13.5-Crossing vessel and  stricture of ureter without hydronephrosis; N20.1-Calculus of ureter.     COMPARISON: None.     FINDINGS: Dictation is to record 12 seconds of fluoroscopy time, by  history during cystoscopy, ureteroscopy, and retrograde pyelogram. There  are no associated images.       Impression:       Fluoroscopy provided cystoscopy.     DICTATED:   02/22/2020  EDITED/ls :   02/23/2020                   Order Current Status    STONE ANALYSIS - Calculus, Ureter, Left Collected (02/22/20 1701)        Discharge Medications and Follow-Up        Discharge Medications      New Medications      Instructions Start Date   sennosides-docusate 8.6-50 MG per tablet  Commonly known  as:  PERICOLACE   2 tablets, Oral, 2 Times Daily         Changes to Medications      Instructions Start Date   methocarbamol 500 MG tablet  Commonly known as:  ROBAXIN  What changed:    when to take this  reasons to take this   500 mg, Oral, 4 Times Daily         Continue These Medications      Instructions Start Date   azelastine 0.05 % ophthalmic solution  Commonly known as:  OPTIVAR   1 drop, Both Eyes, 2 Times Daily      eszopiclone 2 MG tablet  Commonly known as:  LUNESTA   2 mg, Oral, Nightly, Take immediately before bedtime      EVAMIST 1.53 MG/SPRAY transdermal spray  Generic drug:  estradiol   1 spray, Transdermal, Daily      ipratropium 0.06 % nasal spray  Commonly known as:  ATROVENT   1 spray, Nasal, Daily      oxyCODONE-acetaminophen 5-325 MG per tablet  Commonly known as:  PERCOCET   1 tablet, Oral, Every 4 Hours PRN      prochlorperazine 25 MG suppository  Commonly known as:  COMPAZINE   25 mg, Rectal, Every 12 Hours PRN      progesterone 100 MG capsule  Commonly known as:  PROMETRIUM   100 mg, Oral, Every Evening      rizatriptan 10 MG tablet  Commonly known as:  MAXALT   TAKE 1 TABLET BY MOUTH ONE TIME A DAY AS NEEDED FOR MIGRAINE (max 1 tab in 24 hours)      tamsulosin 0.4 MG capsule 24 hr capsule  Commonly known as:  FLOMAX   0.4 mg, Oral, Daily      VAGIFEM 10 MCG tablet vaginal tablet  Generic drug:  estradiol   1 tablet, Vaginal, 2 Times Weekly         Stop These Medications    sulfamethoxazole-trimethoprim 400-80 MG tablet  Commonly known as:  BACTRIM,SEPTRA              No future appointments.    Additional Instructions for the Follow-ups that You Need to Schedule     Discharge Follow-up with PCP   As directed       Currently Documented PCP:    Ji Montaño MD    PCP Phone Number:    [phone]     Follow Up Details:  as needed         Discharge Follow-up with Specified Provider: Julio as scheduled   As directed      To:  Julio as scheduled               Time Spent on Discharge:  25  minutes    Electronically signed by JUAN Figueroa, 02/23/20, 11:38 AM.

## 2020-02-23 NOTE — PROGRESS NOTES
Urology Progress Note     LOS: 0 days   Patient Care Team:  Ji Montaño MD as PCP - General (Internal Medicine)    Chief Complaint:    Chief Complaint   Patient presents with   • Flank Pain       Subjective     Interval History:     She is doing well.  No pain.  Stent accidentally removed last evening following surgery.      Objective     Vital Signs  Temp:  [98.1 °F (36.7 °C)-99.3 °F (37.4 °C)] 98.3 °F (36.8 °C)  Heart Rate:  [] 77  Resp:  [14-18] 18  BP: (107-122)/(64-70) 119/68  I/O last 3 completed shifts:  In: 1399 [I.V.:1399]  Out: -   No intake/output data recorded.    Physical Exam:   General Appearance: alert, appears stated age and cooperative     Results Review:     I reviewed the patient's new clinical results.  Lab Results (last 24 hours)     Procedure Component Value Units Date/Time    Potassium [[id number]]  (Normal) Collected:  02/22/20 1820    Specimen:  Blood Updated:  02/22/20 1843     Potassium 3.5 mmol/L           Medication Review:   Current Facility-Administered Medications   Medication Dose Route Frequency Provider Last Rate Last Dose   • acetaminophen (TYLENOL) tablet 650 mg  650 mg Oral Q4H PRN Carlito Davis MD        Or   • acetaminophen (TYLENOL) 160 MG/5ML solution 650 mg  650 mg Oral Q4H PRN Carlito Davis MD        Or   • acetaminophen (TYLENOL) suppository 650 mg  650 mg Rectal Q4H PRN Carlito Davis MD       • calcium carbonate EX (TUMS EX) chewable tablet 750 mg  750 mg Oral BID PRN Carlito Davis MD       • dextrose (D50W) 25 g/ 50mL Intravenous Solution 25 g  25 g Intravenous Q15 Min PRN Carlito Davis MD       • dextrose (GLUTOSE) oral gel 15 g  15 g Oral Q15 Min PRN Carlito Davis MD       • glucagon (human recombinant) (GLUCAGEN DIAGNOSTIC) injection 1 mg  1 mg Subcutaneous Q15 Min PRN Carlito Davis MD       • HYDROmorphone (DILAUDID) injection 0.5 mg  0.5 mg Intravenous Q4H PRN Carlito Davis MD   0.5 mg at  02/22/20 1825    And   • naloxone (NARCAN) injection 0.4 mg  0.4 mg Intravenous Q5 Min PRN Carlito Davis MD       • ipratropium (ATROVENT) nasal spray 0.06%  1 spray Each Nare 4x Daily - RT Carlito Davis MD   1 spray at 02/23/20 0811   • ketotifen (ZADITOR) 0.025 % ophthalmic solution 1 drop  1 drop Both Eyes BID Carlito Davis MD       • Magnesium Sulfate 2 gram Bolus, followed by 8 gram infusion (total Mg dose 10 grams)- Mg less than or equal to 1mg/dL  2 g Intravenous PRN Carlito Davis MD        Or   • Magnesium Sulfate 2 gram / 50mL Infusion (GIVE X 3 BAGS TO EQUAL 6GM TOTAL DOSE) - Mg 1.1 - 1.5 mg/dl  2 g Intravenous PRN Carlito Davis MD        Or   • Magnesium Sulfate 4 gram infusion- Mg 1.6-1.9 mg/dL  4 g Intravenous PRN Carlito Davis MD       • methocarbamol (ROBAXIN) tablet 500 mg  500 mg Oral 4x Daily PRN Carlito Davis MD       • ondansetron (ZOFRAN) tablet 4 mg  4 mg Oral Q6H PRN Carlito Davis MD        Or   • ondansetron (ZOFRAN) injection 4 mg  4 mg Intravenous Q6H PRN Carlito Davis MD   4 mg at 02/22/20 1059   • oxyCODONE-acetaminophen (PERCOCET) 5-325 MG per tablet 1 tablet  1 tablet Oral Q4H PRN Carlito Davis MD   1 tablet at 02/22/20 0234   • potassium chloride (MICRO-K) CR capsule 40 mEq  40 mEq Oral PRN Carlito Davis MD        Or   • potassium chloride (KLOR-CON) packet 40 mEq  40 mEq Oral PRN Carlito Davis MD        Or   • potassium chloride 10 mEq in 100 mL IVPB  10 mEq Intravenous Q1H PRN Carlito Davis MD       • prochlorperazine (COMPAZINE) suppository 25 mg  25 mg Rectal Q12H PRN Carlito Davis MD       • progesterone (PROMETRIUM) capsule 200 mg  200 mg Oral Nightly Carlito Davis MD   200 mg at 02/22/20 2039   • sennosides-docusate (PERICOLACE) 8.6-50 MG per tablet 2 tablet  2 tablet Oral BID Carlito Davis MD   2 tablet at 02/23/20 0810   • sodium chloride 0.9 % flush  10 mL  10 mL Intravenous PRN Carlito Davis MD       • sodium chloride 0.9 % flush 10 mL  10 mL Intravenous Q12H Carlito Davis MD   10 mL at 02/22/20 0951   • sodium chloride 0.9 % flush 10 mL  10 mL Intravenous PRN Carlito Davis MD       • tamsulosin (FLOMAX) 24 hr capsule 0.4 mg  0.4 mg Oral Nightly Carlito Davis MD   0.4 mg at 02/22/20 2039   • zolpidem (AMBIEN) tablet 2.5 mg  2.5 mg Oral Nightly Carlito Davis MD             Assessment/Plan       Renal colic on left side    Follow-up with Dr. Kim as outpatient as already scheduled      Carlito Davis MD  02/23/20  10:05 AM

## 2020-02-24 ENCOUNTER — TRANSITIONAL CARE MANAGEMENT TELEPHONE ENCOUNTER (OUTPATIENT)
Dept: CALL CENTER | Facility: HOSPITAL | Age: 55
End: 2020-02-24

## 2020-02-26 ENCOUNTER — APPOINTMENT (OUTPATIENT)
Dept: CT IMAGING | Facility: HOSPITAL | Age: 55
End: 2020-02-26

## 2020-02-26 ENCOUNTER — HOSPITAL ENCOUNTER (EMERGENCY)
Facility: HOSPITAL | Age: 55
Discharge: HOME | End: 2020-02-26
Attending: EMERGENCY MEDICINE | Admitting: EMERGENCY MEDICINE

## 2020-02-26 VITALS
WEIGHT: 152 LBS | SYSTOLIC BLOOD PRESSURE: 132 MMHG | HEIGHT: 65 IN | TEMPERATURE: 98.6 F | RESPIRATION RATE: 18 BRPM | HEART RATE: 84 BPM | BODY MASS INDEX: 25.33 KG/M2 | DIASTOLIC BLOOD PRESSURE: 80 MMHG | OXYGEN SATURATION: 100 %

## 2020-02-26 DIAGNOSIS — N20.0 KIDNEY STONE: Primary | ICD-10-CM

## 2020-02-26 DIAGNOSIS — N13.30 HYDRONEPHROSIS, LEFT: ICD-10-CM

## 2020-02-26 LAB
ALBUMIN SERPL-MCNC: 4 G/DL (ref 3.5–5.2)
ALBUMIN/GLOB SERPL: 1.7 G/DL
ALP SERPL-CCNC: 60 U/L (ref 39–117)
ALT SERPL W P-5'-P-CCNC: 13 U/L (ref 1–33)
ANION GAP SERPL CALCULATED.3IONS-SCNC: 12 MMOL/L (ref 5–15)
AST SERPL-CCNC: 17 U/L (ref 1–32)
BACTERIA UR QL AUTO: ABNORMAL /HPF
BASOPHILS # BLD AUTO: 0.05 10*3/MM3 (ref 0–0.2)
BASOPHILS NFR BLD AUTO: 0.7 % (ref 0–1.5)
BILIRUB SERPL-MCNC: 0.4 MG/DL (ref 0.2–1.2)
BILIRUB UR QL STRIP: NEGATIVE
BUN BLD-MCNC: 17 MG/DL (ref 6–20)
BUN/CREAT SERPL: 21.5 (ref 7–25)
CALCIUM SPEC-SCNC: 9.2 MG/DL (ref 8.6–10.5)
CHLORIDE SERPL-SCNC: 101 MMOL/L (ref 98–107)
CLARITY UR: CLEAR
CO2 SERPL-SCNC: 27 MMOL/L (ref 22–29)
COLOR UR: YELLOW
CREAT BLD-MCNC: 0.79 MG/DL (ref 0.57–1)
DEPRECATED RDW RBC AUTO: 42.3 FL (ref 37–54)
EOSINOPHIL # BLD AUTO: 0.07 10*3/MM3 (ref 0–0.4)
EOSINOPHIL NFR BLD AUTO: 1 % (ref 0.3–6.2)
ERYTHROCYTE [DISTWIDTH] IN BLOOD BY AUTOMATED COUNT: 11.9 % (ref 12.3–15.4)
GFR SERPL CREATININE-BSD FRML MDRD: 75 ML/MIN/1.73
GLOBULIN UR ELPH-MCNC: 2.4 GM/DL
GLUCOSE BLD-MCNC: 108 MG/DL (ref 65–99)
GLUCOSE UR STRIP-MCNC: NEGATIVE MG/DL
HCT VFR BLD AUTO: 38 % (ref 34–46.6)
HGB BLD-MCNC: 12.2 G/DL (ref 12–15.9)
HGB UR QL STRIP.AUTO: ABNORMAL
HOLD SPECIMEN: NORMAL
HOLD SPECIMEN: NORMAL
HYALINE CASTS UR QL AUTO: ABNORMAL /LPF
IMM GRANULOCYTES # BLD AUTO: 0.01 10*3/MM3 (ref 0–0.05)
IMM GRANULOCYTES NFR BLD AUTO: 0.1 % (ref 0–0.5)
KETONES UR QL STRIP: NEGATIVE
LEUKOCYTE ESTERASE UR QL STRIP.AUTO: ABNORMAL
LIPASE SERPL-CCNC: 9 U/L (ref 13–60)
LYMPHOCYTES # BLD AUTO: 1.75 10*3/MM3 (ref 0.7–3.1)
LYMPHOCYTES NFR BLD AUTO: 25 % (ref 19.6–45.3)
MCH RBC QN AUTO: 31.4 PG (ref 26.6–33)
MCHC RBC AUTO-ENTMCNC: 32.1 G/DL (ref 31.5–35.7)
MCV RBC AUTO: 97.7 FL (ref 79–97)
MONOCYTES # BLD AUTO: 0.39 10*3/MM3 (ref 0.1–0.9)
MONOCYTES NFR BLD AUTO: 5.6 % (ref 5–12)
NEUTROPHILS # BLD AUTO: 4.72 10*3/MM3 (ref 1.7–7)
NEUTROPHILS NFR BLD AUTO: 67.6 % (ref 42.7–76)
NITRITE UR QL STRIP: NEGATIVE
NRBC BLD AUTO-RTO: 0 /100 WBC (ref 0–0.2)
PH UR STRIP.AUTO: 6.5 [PH] (ref 5–8)
PLATELET # BLD AUTO: 208 10*3/MM3 (ref 140–450)
PMV BLD AUTO: 11.5 FL (ref 6–12)
POTASSIUM BLD-SCNC: 3.7 MMOL/L (ref 3.5–5.2)
PROT SERPL-MCNC: 6.4 G/DL (ref 6–8.5)
PROT UR QL STRIP: NEGATIVE
RBC # BLD AUTO: 3.89 10*6/MM3 (ref 3.77–5.28)
RBC # UR: ABNORMAL /HPF
REF LAB TEST METHOD: ABNORMAL
SODIUM BLD-SCNC: 140 MMOL/L (ref 136–145)
SP GR UR STRIP: 1.02 (ref 1–1.03)
SQUAMOUS #/AREA URNS HPF: ABNORMAL /HPF
UROBILINOGEN UR QL STRIP: ABNORMAL
WBC NRBC COR # BLD: 6.99 10*3/MM3 (ref 3.4–10.8)
WBC UR QL AUTO: ABNORMAL /HPF
WHOLE BLOOD HOLD SPECIMEN: NORMAL
WHOLE BLOOD HOLD SPECIMEN: NORMAL

## 2020-02-26 PROCEDURE — 85025 COMPLETE CBC W/AUTO DIFF WBC: CPT | Performed by: EMERGENCY MEDICINE

## 2020-02-26 PROCEDURE — 25010000002 ONDANSETRON PER 1 MG: Performed by: EMERGENCY MEDICINE

## 2020-02-26 PROCEDURE — 99283 EMERGENCY DEPT VISIT LOW MDM: CPT

## 2020-02-26 PROCEDURE — 25010000002 KETOROLAC TROMETHAMINE PER 15 MG: Performed by: EMERGENCY MEDICINE

## 2020-02-26 PROCEDURE — 74176 CT ABD & PELVIS W/O CONTRAST: CPT

## 2020-02-26 PROCEDURE — 96375 TX/PRO/DX INJ NEW DRUG ADDON: CPT

## 2020-02-26 PROCEDURE — 81001 URINALYSIS AUTO W/SCOPE: CPT | Performed by: EMERGENCY MEDICINE

## 2020-02-26 PROCEDURE — 83690 ASSAY OF LIPASE: CPT | Performed by: EMERGENCY MEDICINE

## 2020-02-26 PROCEDURE — 96374 THER/PROPH/DIAG INJ IV PUSH: CPT

## 2020-02-26 PROCEDURE — 80053 COMPREHEN METABOLIC PANEL: CPT | Performed by: EMERGENCY MEDICINE

## 2020-02-26 RX ORDER — KETOROLAC TROMETHAMINE 15 MG/ML
15 INJECTION, SOLUTION INTRAMUSCULAR; INTRAVENOUS ONCE
Status: COMPLETED | OUTPATIENT
Start: 2020-02-26 | End: 2020-02-26

## 2020-02-26 RX ORDER — ONDANSETRON 2 MG/ML
4 INJECTION INTRAMUSCULAR; INTRAVENOUS ONCE
Status: COMPLETED | OUTPATIENT
Start: 2020-02-26 | End: 2020-02-26

## 2020-02-26 RX ORDER — KETOROLAC TROMETHAMINE 10 MG/1
10 TABLET, FILM COATED ORAL EVERY 6 HOURS PRN
Qty: 14 TABLET | Refills: 0 | Status: SHIPPED | OUTPATIENT
Start: 2020-02-26 | End: 2020-05-07

## 2020-02-26 RX ORDER — SODIUM CHLORIDE 0.9 % (FLUSH) 0.9 %
10 SYRINGE (ML) INJECTION AS NEEDED
Status: DISCONTINUED | OUTPATIENT
Start: 2020-02-26 | End: 2020-02-26 | Stop reason: HOSPADM

## 2020-02-26 RX ADMIN — KETOROLAC TROMETHAMINE 15 MG: 15 INJECTION, SOLUTION INTRAMUSCULAR; INTRAVENOUS at 05:25

## 2020-02-26 RX ADMIN — ONDANSETRON 4 MG: 2 INJECTION INTRAMUSCULAR; INTRAVENOUS at 05:25

## 2020-02-26 RX ADMIN — SODIUM CHLORIDE 1000 ML: 9 INJECTION, SOLUTION INTRAVENOUS at 05:25

## 2020-03-10 LAB
COD CRY STONE QL IR: 20 %
COLOR STONE: NORMAL
COM CRY STONE QL IR: 80 %
COMPN STONE: NORMAL
LABORATORY COMMENT REPORT: NORMAL
Lab: NORMAL
Lab: NORMAL
PHOTO: NORMAL
SIZE STONE: NORMAL MM
SPECIMEN SOURCE: NORMAL
WT STONE: 15.4 MG

## 2020-05-07 ENCOUNTER — OFFICE VISIT (OUTPATIENT)
Dept: INTERNAL MEDICINE | Facility: CLINIC | Age: 55
End: 2020-05-07

## 2020-05-07 ENCOUNTER — TELEPHONE (OUTPATIENT)
Dept: INTERNAL MEDICINE | Facility: CLINIC | Age: 55
End: 2020-05-07

## 2020-05-07 VITALS
OXYGEN SATURATION: 98 % | HEART RATE: 95 BPM | BODY MASS INDEX: 25.79 KG/M2 | DIASTOLIC BLOOD PRESSURE: 70 MMHG | WEIGHT: 154.8 LBS | HEIGHT: 65 IN | TEMPERATURE: 98 F | SYSTOLIC BLOOD PRESSURE: 120 MMHG

## 2020-05-07 DIAGNOSIS — G44.209 TENSION-TYPE HEADACHE, NOT INTRACTABLE, UNSPECIFIED CHRONICITY PATTERN: Primary | ICD-10-CM

## 2020-05-07 DIAGNOSIS — G47.9 SLEEP DISTURBANCE: ICD-10-CM

## 2020-05-07 PROCEDURE — 99214 OFFICE O/P EST MOD 30 MIN: CPT | Performed by: INTERNAL MEDICINE

## 2020-05-07 RX ORDER — ESZOPICLONE 2 MG/1
2 TABLET, FILM COATED ORAL NIGHTLY
Qty: 30 TABLET | Refills: 1 | Status: SHIPPED | OUTPATIENT
Start: 2020-05-07 | End: 2021-01-19 | Stop reason: SDUPTHER

## 2020-05-07 RX ORDER — ZOLMITRIPTAN 5 MG/1
1 SPRAY NASAL AS NEEDED
Qty: 6 EACH | Refills: 3 | Status: SHIPPED | OUTPATIENT
Start: 2020-05-07 | End: 2021-01-19

## 2020-05-07 NOTE — TELEPHONE ENCOUNTER
PT CALLED IN STATED SHE HAS HAD A SEVERE HEADACHE SINCE 05/04/20 AND IT'S NOT GOING AWAY AND THE MIGRAINE MEDICATION IS NOT HELPING PT WANTED TO BE WORKED IN FOR AN APPT TODAY IF POSSIBLE OR PLEASE ADVISE ON WHAT SHE SHOULD DO      PT CB NUMBER: [phone]  [facility]: [facility]  [phone]

## 2020-05-07 NOTE — PROGRESS NOTES
Subjective   Britni Raphael is a 55 y.o. female.     Chief Complaint   Patient presents with   • Headache     migraines / sinus headache x 4 days, some nausea, pain behind right eye        History of Present Illness   4 day HA off and on, dull, nausea, behind right eye, robaxin and migraine med no help. Allergy shots no help. Stress at work. Dull pain, no throb, photophobia, no phonophobia, no blurry vision, no sinus congestion. No running nose, pain last 30' to 4 hours. Hx of migraine and 2 x a month, now for 4 days, daily,    Current Outpatient Medications:   •  azelastine (OPTIVAR) 0.05 % ophthalmic solution, Administer 1 drop to both eyes 2 (Two) Times a Day., Disp: , Rfl:   •  estradiol (EVAMIST) 1.53 MG/SPRAY transdermal spray, Place 1 spray on the skin as directed by provider Daily., Disp: , Rfl:   •  estradiol (VAGIFEM) 10 MCG tablet vaginal tablet, Insert 1 tablet into the vagina 2 (Two) Times a Week., Disp: , Rfl:   •  eszopiclone (LUNESTA) 2 MG tablet, Take 1 tablet by mouth Every Night. Take immediately before bedtime, Disp: 30 tablet, Rfl: 1  •  ipratropium (ATROVENT) 0.06 % nasal spray, 1 spray into the nostril(s) as directed by provider Daily., Disp: , Rfl:   •  methocarbamol (ROBAXIN) 500 MG tablet, Take 1 tablet by mouth 4 (Four) Times a Day. (Patient taking differently: Take 500 mg by mouth 4 (Four) Times a Day As Needed.), Disp: 30 tablet, Rfl: 1  •  progesterone (PROMETRIUM) 100 MG capsule, Take 100 mg by mouth Every Evening., Disp: , Rfl:   •  ZOLMitriptan (Zomig) 5 MG nasal solution, 1 spray into the nostril(s) as directed by provider As Needed for Migraine. No more than 2 a day, Disp: 6 each, Rfl: 3    The following portions of the patient's history were reviewed and updated as appropriate: allergies, current medications, past family history, past medical history, past social history, past surgical history and problem list.    Review of Systems   Constitutional: Negative.    Respiratory:  Negative.    Cardiovascular: Negative.    Gastrointestinal: Negative.    Musculoskeletal: Negative.    Skin: Negative.    Neurological: Positive for headaches.   Psychiatric/Behavioral: Negative.        Objective   Physical Exam   Constitutional: She is oriented to person, place, and time. She appears well-nourished.   Neck: Neck supple.   Cardiovascular: Normal rate, regular rhythm and normal heart sounds.   Pulmonary/Chest: Effort normal and breath sounds normal.   Abdominal: Bowel sounds are normal.   Neurological: She is alert and oriented to person, place, and time.   Skin: Skin is warm.   Psychiatric: She has a normal mood and affect.       All tests have been reviewed.    Assessment/Plan   Diagnoses and all orders for this visit:    Tension-type headache, not intractable, unspecified chronicity pattern  -     ZOLMitriptan (Zomig) 5 MG nasal solution; 1 spray into the nostril(s) as directed by provider As Needed for Migraine. No more than 2 a day    Sleep disturbance  -     eszopiclone (LUNESTA) 2 MG tablet; Take 1 tablet by mouth Every Night. Take immediately before bedtime        Patient to schedule PE within a  Month. Patient declines CT        historical   Migraine headache   Olfactory hallucinations schedule rule out pathology of uncus, encouraged patient to use a nasal saline spray. If no better we may need to send patient to ENT doctor  Sinusitis encouraged patient to do so intact and the nasal saline spray  Chest pain stress test normal, probable GERD start prevacid   GERD and dysphagia do EGD  Hyperglycemia diet  Stress in life  Insomnia lunesta  Neck pain robaxin start  HRT continue  Gyn per gyn  Colon 2014 repeat 2019

## 2020-05-14 ENCOUNTER — OFFICE VISIT (OUTPATIENT)
Dept: INTERNAL MEDICINE | Facility: CLINIC | Age: 55
End: 2020-05-14

## 2020-05-14 VITALS
HEART RATE: 107 BPM | WEIGHT: 152.8 LBS | SYSTOLIC BLOOD PRESSURE: 110 MMHG | BODY MASS INDEX: 25.46 KG/M2 | OXYGEN SATURATION: 98 % | HEIGHT: 65 IN | TEMPERATURE: 98.2 F | DIASTOLIC BLOOD PRESSURE: 70 MMHG

## 2020-05-14 DIAGNOSIS — G44.209 TENSION-TYPE HEADACHE, NOT INTRACTABLE, UNSPECIFIED CHRONICITY PATTERN: ICD-10-CM

## 2020-05-14 DIAGNOSIS — Z00.00 HEALTH CARE MAINTENANCE: ICD-10-CM

## 2020-05-14 DIAGNOSIS — Z78.0 MENOPAUSE: ICD-10-CM

## 2020-05-14 DIAGNOSIS — G47.00 INSOMNIA, UNSPECIFIED TYPE: ICD-10-CM

## 2020-05-14 DIAGNOSIS — K21.9 GASTROESOPHAGEAL REFLUX DISEASE WITHOUT ESOPHAGITIS: Primary | ICD-10-CM

## 2020-05-14 DIAGNOSIS — E55.9 VITAMIN D DEFICIENCY DISEASE: ICD-10-CM

## 2020-05-14 DIAGNOSIS — G47.9 SLEEP DISTURBANCE: ICD-10-CM

## 2020-05-14 DIAGNOSIS — E05.90 HYPERTHYROIDISM: ICD-10-CM

## 2020-05-14 PROBLEM — N23 RENAL COLIC ON LEFT SIDE: Status: RESOLVED | Noted: 2020-02-21 | Resolved: 2020-05-14

## 2020-05-14 PROCEDURE — 99396 PREV VISIT EST AGE 40-64: CPT | Performed by: INTERNAL MEDICINE

## 2020-05-14 NOTE — PROGRESS NOTES
Subjective   Britni Raphael is a 55 y.o. female and is here for a comprehensive physical exam.     Do you take any herbs or supplements that were not prescribed by a doctor? no  Are you taking calcium supplements? no  Are you taking aspirin daily? no      The following portions of the patient's history were reviewed and updated as appropriate: allergies, current medications, past family history, past medical history, past social history, past surgical history and problem list.      Review of Systems   Constitutional: Negative.    HENT: Negative.    Eyes: Negative.    Respiratory: Negative.    Cardiovascular: Negative.    Gastrointestinal: Negative.    Endocrine: Negative.    Genitourinary: Negative.    Musculoskeletal: Negative.    Skin: Negative.    Allergic/Immunologic: Negative.    Neurological: Negative.    Hematological: Negative.    Psychiatric/Behavioral: Negative.          Physical Exam   Constitutional: She is oriented to person, place, and time. She appears well-developed and well-nourished.   HENT:   Head: Normocephalic and atraumatic.   Right Ear: External ear normal.   Left Ear: External ear normal.   Nose: Nose normal.   Mouth/Throat: Oropharynx is clear and moist.   Eyes: Pupils are equal, round, and reactive to light. Conjunctivae and EOM are normal.   Neck: Normal range of motion. Neck supple.   Cardiovascular: Normal rate, regular rhythm, normal heart sounds and intact distal pulses.   Pulmonary/Chest: Effort normal and breath sounds normal.   Abdominal: Soft. Bowel sounds are normal.   Musculoskeletal: Normal range of motion.   Neurological: She is alert and oriented to person, place, and time. She has normal reflexes.   Skin: Skin is warm and dry.   Psychiatric: She has a normal mood and affect. Her behavior is normal. Judgment and thought content normal.       All  tests have been reviewed.    Assessment/Plan          1. Patient Counseling:  --Nutrition: Stressed importance of moderation in  sodium/caffeine intake, saturated fat and cholesterol, caloric balance, sufficient intake of fresh fruits, vegetables, fiber, calcium and iron.  --Exercise: Stressed the importance of regular exercise.   --Injury prevention: Discussed safety belts, safety helmets, smoke detector, smoking near bedding or upholstery.   --Dental health: Discussed importance of regular tooth brushing, flossing, and dental visits.  --Immunizations reviewed.  --Discussed benefits of screening colonoscopy.  --After hours service discussed with patient    2. Discussed the patient's BMI with her.            Migraine headache   Olfactory hallucinations schedule rule out pathology of uncus, encouraged patient to use a nasal saline spray. If no better we may need to send patient to ENT doctor  Sinusitis encouraged patient to do so intact and the nasal saline spray  GERD s/p EGD  Kidney pending lithotripsy, s/p surgery  Hyperglycemia diet  Stress in life  Insomnia lunesta  Neck pain robaxin prn  HRT continue  Gyn per gyn  tdap and singrix patient done,   Colon 2018  Do labs   Annual PE

## 2020-05-15 ENCOUNTER — PRIOR AUTHORIZATION (OUTPATIENT)
Dept: INTERNAL MEDICINE | Facility: CLINIC | Age: 55
End: 2020-05-15

## 2020-05-15 RX ORDER — METHOCARBAMOL 500 MG/1
500 TABLET, FILM COATED ORAL 4 TIMES DAILY PRN
Qty: 30 TABLET | Refills: 3 | Status: SHIPPED | OUTPATIENT
Start: 2020-05-15 | End: 2021-01-19

## 2020-05-19 LAB
25(OH)D3+25(OH)D2 SERPL-MCNC: 92.6 NG/ML (ref 30–100)
CHOLEST SERPL-MCNC: 234 MG/DL (ref 0–200)
HDLC SERPL-MCNC: 90 MG/DL (ref 40–60)
LDLC SERPL CALC-MCNC: 137 MG/DL (ref 0–100)
TRIGL SERPL-MCNC: 37 MG/DL (ref 0–150)
TSH SERPL DL<=0.005 MIU/L-ACNC: 1.57 UIU/ML (ref 0.27–4.2)
VLDLC SERPL CALC-MCNC: 7.4 MG/DL

## 2020-07-16 ENCOUNTER — TRANSCRIBE ORDERS (OUTPATIENT)
Dept: ADMINISTRATIVE | Facility: HOSPITAL | Age: 55
End: 2020-07-16

## 2020-07-16 DIAGNOSIS — Z12.31 ENCOUNTER FOR SCREENING MAMMOGRAM FOR MALIGNANT NEOPLASM OF BREAST: Primary | ICD-10-CM

## 2020-10-13 ENCOUNTER — HOSPITAL ENCOUNTER (OUTPATIENT)
Dept: MAMMOGRAPHY | Facility: HOSPITAL | Age: 55
Discharge: HOME | End: 2020-10-13
Admitting: OBSTETRICS & GYNECOLOGY

## 2020-10-13 DIAGNOSIS — Z12.31 ENCOUNTER FOR SCREENING MAMMOGRAM FOR MALIGNANT NEOPLASM OF BREAST: ICD-10-CM

## 2020-10-13 PROCEDURE — 77063 BREAST TOMOSYNTHESIS BI: CPT | Performed by: RADIOLOGY

## 2020-10-13 PROCEDURE — 77063 BREAST TOMOSYNTHESIS BI: CPT

## 2020-10-13 PROCEDURE — 77067 SCR MAMMO BI INCL CAD: CPT | Performed by: RADIOLOGY

## 2020-10-13 PROCEDURE — 77067 SCR MAMMO BI INCL CAD: CPT

## 2020-11-17 ENCOUNTER — APPOINTMENT (OUTPATIENT)
Dept: PREADMISSION TESTING | Facility: HOSPITAL | Age: 55
End: 2020-11-17

## 2020-11-17 PROCEDURE — U0004 COV-19 TEST NON-CDC HGH THRU: HCPCS

## 2020-11-17 PROCEDURE — C9803 HOPD COVID-19 SPEC COLLECT: HCPCS

## 2020-11-18 LAB — SARS-COV-2 RNA RESP QL NAA+PROBE: NOT DETECTED

## 2020-11-19 ENCOUNTER — ANESTHESIA EVENT (OUTPATIENT)
Dept: PERIOP | Facility: HOSPITAL | Age: 55
End: 2020-11-19

## 2020-11-19 RX ORDER — FAMOTIDINE 10 MG/ML
20 INJECTION, SOLUTION INTRAVENOUS ONCE
Status: CANCELLED | OUTPATIENT
Start: 2020-11-19 | End: 2020-11-19

## 2020-11-20 ENCOUNTER — ANESTHESIA (OUTPATIENT)
Dept: PERIOP | Facility: HOSPITAL | Age: 55
End: 2020-11-20

## 2020-11-20 ENCOUNTER — HOSPITAL ENCOUNTER (OUTPATIENT)
Facility: HOSPITAL | Age: 55
Setting detail: HOSPITAL OUTPATIENT SURGERY
Discharge: HOME | End: 2020-11-20
Attending: UROLOGY | Admitting: UROLOGY

## 2020-11-20 VITALS
WEIGHT: 150 LBS | HEART RATE: 105 BPM | DIASTOLIC BLOOD PRESSURE: 60 MMHG | HEIGHT: 65 IN | BODY MASS INDEX: 24.99 KG/M2 | OXYGEN SATURATION: 97 % | SYSTOLIC BLOOD PRESSURE: 119 MMHG | TEMPERATURE: 98 F | RESPIRATION RATE: 16 BRPM

## 2020-11-20 DIAGNOSIS — N20.0 KIDNEY STONE: Primary | ICD-10-CM

## 2020-11-20 PROCEDURE — 25010000002 CEFOXITIN: Performed by: UROLOGY

## 2020-11-20 PROCEDURE — 25010000002 PROPOFOL 10 MG/ML EMULSION: Performed by: NURSE ANESTHETIST, CERTIFIED REGISTERED

## 2020-11-20 PROCEDURE — 25010000002 DEXAMETHASONE PER 1 MG: Performed by: NURSE ANESTHETIST, CERTIFIED REGISTERED

## 2020-11-20 PROCEDURE — 25010000002 FENTANYL CITRATE (PF) 100 MCG/2ML SOLUTION: Performed by: NURSE ANESTHETIST, CERTIFIED REGISTERED

## 2020-11-20 PROCEDURE — 25010000002 PHENYLEPHRINE PER 1 ML: Performed by: NURSE ANESTHETIST, CERTIFIED REGISTERED

## 2020-11-20 RX ORDER — FAMOTIDINE 20 MG/1
20 TABLET, FILM COATED ORAL ONCE
Status: COMPLETED | OUTPATIENT
Start: 2020-11-20 | End: 2020-11-20

## 2020-11-20 RX ORDER — PROMETHAZINE HYDROCHLORIDE 25 MG/1
25 SUPPOSITORY RECTAL ONCE AS NEEDED
Status: DISCONTINUED | OUTPATIENT
Start: 2020-11-20 | End: 2020-12-07 | Stop reason: HOSPADM

## 2020-11-20 RX ORDER — MEPERIDINE HYDROCHLORIDE 25 MG/ML
12.5 INJECTION INTRAMUSCULAR; INTRAVENOUS; SUBCUTANEOUS
Status: DISCONTINUED | OUTPATIENT
Start: 2020-11-20 | End: 2020-11-21 | Stop reason: HOSPADM

## 2020-11-20 RX ORDER — SODIUM CHLORIDE 0.9 % (FLUSH) 0.9 %
10 SYRINGE (ML) INJECTION EVERY 12 HOURS SCHEDULED
Status: DISCONTINUED | OUTPATIENT
Start: 2020-11-20 | End: 2020-11-20 | Stop reason: HOSPADM

## 2020-11-20 RX ORDER — DEXAMETHASONE SODIUM PHOSPHATE 4 MG/ML
INJECTION, SOLUTION INTRA-ARTICULAR; INTRALESIONAL; INTRAMUSCULAR; INTRAVENOUS; SOFT TISSUE AS NEEDED
Status: DISCONTINUED | OUTPATIENT
Start: 2020-11-20 | End: 2020-11-20 | Stop reason: SURG

## 2020-11-20 RX ORDER — PROMETHAZINE HYDROCHLORIDE 25 MG/1
25 TABLET ORAL ONCE AS NEEDED
Status: DISCONTINUED | OUTPATIENT
Start: 2020-11-20 | End: 2020-12-07 | Stop reason: HOSPADM

## 2020-11-20 RX ORDER — DOCUSATE SODIUM 100 MG/1
100 CAPSULE, LIQUID FILLED ORAL DAILY PRN
Qty: 30 CAPSULE | Refills: 1 | Status: SHIPPED | OUTPATIENT
Start: 2020-11-20 | End: 2021-11-20

## 2020-11-20 RX ORDER — LIDOCAINE HYDROCHLORIDE 10 MG/ML
INJECTION, SOLUTION EPIDURAL; INFILTRATION; INTRACAUDAL; PERINEURAL AS NEEDED
Status: DISCONTINUED | OUTPATIENT
Start: 2020-11-20 | End: 2020-11-20 | Stop reason: SURG

## 2020-11-20 RX ORDER — DOXYCYCLINE HYCLATE 100 MG/1
100 CAPSULE ORAL DAILY
Qty: 3 CAPSULE | Refills: 0 | Status: SHIPPED | OUTPATIENT
Start: 2020-11-20 | End: 2020-11-23

## 2020-11-20 RX ORDER — FENTANYL CITRATE 50 UG/ML
50 INJECTION, SOLUTION INTRAMUSCULAR; INTRAVENOUS
Status: DISCONTINUED | OUTPATIENT
Start: 2020-11-20 | End: 2020-12-07 | Stop reason: HOSPADM

## 2020-11-20 RX ORDER — PROPOFOL 10 MG/ML
VIAL (ML) INTRAVENOUS AS NEEDED
Status: DISCONTINUED | OUTPATIENT
Start: 2020-11-20 | End: 2020-11-20 | Stop reason: SURG

## 2020-11-20 RX ORDER — OXYCODONE AND ACETAMINOPHEN 7.5; 325 MG/1; MG/1
1 TABLET ORAL EVERY 4 HOURS PRN
Qty: 20 TABLET | Refills: 0 | Status: SHIPPED | OUTPATIENT
Start: 2020-11-20 | End: 2021-01-19

## 2020-11-20 RX ORDER — SODIUM CHLORIDE 0.9 % (FLUSH) 0.9 %
10 SYRINGE (ML) INJECTION AS NEEDED
Status: DISCONTINUED | OUTPATIENT
Start: 2020-11-20 | End: 2020-11-20 | Stop reason: HOSPADM

## 2020-11-20 RX ORDER — SODIUM CHLORIDE, SODIUM LACTATE, POTASSIUM CHLORIDE, CALCIUM CHLORIDE 600; 310; 30; 20 MG/100ML; MG/100ML; MG/100ML; MG/100ML
9 INJECTION, SOLUTION INTRAVENOUS CONTINUOUS
Status: DISCONTINUED | OUTPATIENT
Start: 2020-11-20 | End: 2020-12-07 | Stop reason: HOSPADM

## 2020-11-20 RX ORDER — ONDANSETRON 2 MG/ML
4 INJECTION INTRAMUSCULAR; INTRAVENOUS ONCE AS NEEDED
Status: DISCONTINUED | OUTPATIENT
Start: 2020-11-20 | End: 2020-12-07 | Stop reason: HOSPADM

## 2020-11-20 RX ORDER — FENTANYL CITRATE 50 UG/ML
INJECTION, SOLUTION INTRAMUSCULAR; INTRAVENOUS AS NEEDED
Status: DISCONTINUED | OUTPATIENT
Start: 2020-11-20 | End: 2020-11-20 | Stop reason: SURG

## 2020-11-20 RX ORDER — LIDOCAINE HYDROCHLORIDE 10 MG/ML
0.5 INJECTION, SOLUTION EPIDURAL; INFILTRATION; INTRACAUDAL; PERINEURAL ONCE AS NEEDED
Status: COMPLETED | OUTPATIENT
Start: 2020-11-20 | End: 2020-11-20

## 2020-11-20 RX ADMIN — PHENYLEPHRINE HYDROCHLORIDE 80 MCG: 10 INJECTION INTRAVENOUS at 09:39

## 2020-11-20 RX ADMIN — DEXAMETHASONE SODIUM PHOSPHATE 8 MG: 4 INJECTION, SOLUTION INTRAMUSCULAR; INTRAVENOUS at 09:10

## 2020-11-20 RX ADMIN — EPHEDRINE SULFATE 10 MG: 50 INJECTION INTRAMUSCULAR; INTRAVENOUS; SUBCUTANEOUS at 09:28

## 2020-11-20 RX ADMIN — LIDOCAINE HYDROCHLORIDE 50 MG: 10 INJECTION, SOLUTION EPIDURAL; INFILTRATION; INTRACAUDAL; PERINEURAL at 09:06

## 2020-11-20 RX ADMIN — FAMOTIDINE 20 MG: 20 TABLET, FILM COATED ORAL at 08:18

## 2020-11-20 RX ADMIN — SODIUM CHLORIDE, POTASSIUM CHLORIDE, SODIUM LACTATE AND CALCIUM CHLORIDE 9 ML/HR: 600; 310; 30; 20 INJECTION, SOLUTION INTRAVENOUS at 08:15

## 2020-11-20 RX ADMIN — FENTANYL CITRATE 50 MCG: 50 INJECTION, SOLUTION INTRAMUSCULAR; INTRAVENOUS at 09:06

## 2020-11-20 RX ADMIN — LIDOCAINE HYDROCHLORIDE 0.2 ML: 10 INJECTION, SOLUTION EPIDURAL; INFILTRATION; INTRACAUDAL; PERINEURAL at 08:15

## 2020-11-20 RX ADMIN — CEFOXITIN 2 G: 2 INJECTION, POWDER, FOR SOLUTION INTRAVENOUS at 09:01

## 2020-11-20 RX ADMIN — EPHEDRINE SULFATE 10 MG: 50 INJECTION INTRAMUSCULAR; INTRAVENOUS; SUBCUTANEOUS at 09:19

## 2020-11-20 RX ADMIN — PROPOFOL 150 MG: 10 INJECTION, EMULSION INTRAVENOUS at 09:06

## 2020-11-20 NOTE — ANESTHESIA PREPROCEDURE EVALUATION
Anesthesia Evaluation     Patient summary reviewed and Nursing notes reviewed                Airway   Mallampati: II  Dental      Pulmonary - negative pulmonary ROS   Cardiovascular - negative cardio ROS        Neuro/Psych- negative ROS  GI/Hepatic/Renal/Endo    (+)  GERD,  renal disease,     Musculoskeletal (-) negative ROS    Abdominal    Substance History - negative use     OB/GYN negative ob/gyn ROS         Other                        Anesthesia Plan    ASA 3     general     intravenous induction     Anesthetic plan, all risks, benefits, and alternatives have been provided, discussed and informed consent has been obtained with: patient.

## 2020-11-20 NOTE — ANESTHESIA POSTPROCEDURE EVALUATION
Patient: Britni Raphael    Procedure Summary     Date: 11/20/20 Room / Location:  [facility]  [facility]    Anesthesia Start: 0900 Anesthesia Stop:     Procedure: RIGHT EXTRACORPOREAL SHOCKWAVE LITHOTRIPSY (Right ) Diagnosis:     Surgeon: Domo Kim Jr., MD Provider: Domo Mak MD    Anesthesia Type: general ASA Status: 3          Anesthesia Type: general    Vitals  Vitals Value Taken Time   /54 11/20/20 0952   Temp 97 °F (36.1 °C) 11/20/20 0952   Pulse 116 11/20/20 0952   Resp 16 11/20/20 0949   SpO2 97 % 11/20/20 0952           Post Anesthesia Care and Evaluation    Patient location during evaluation: PACU  Patient participation: waiting for patient participation  Level of consciousness: responsive to physical stimuli  Pain management: adequate  Airway patency: patent  Anesthetic complications: No anesthetic complications  PONV Status: none  Cardiovascular status: hemodynamically stable and acceptable  Respiratory status: nonlabored ventilation, acceptable and room air  Hydration status: acceptable

## 2020-11-20 NOTE — ANESTHESIA PROCEDURE NOTES
Airway  Urgency: elective    Date/Time: 11/20/2020 9:07 AM  Airway not difficult    General Information and Staff    Patient location during procedure: OR  CRNA: Flo Robles CRNA    Indications and Patient Condition  Indications for airway management: airway protection    Preoxygenated: yes  Mask difficulty assessment: 1 - vent by mask    Final Airway Details  Final airway type: supraglottic airway      Successful airway: I-gel  Size 3    Number of attempts at approach: 1  Assessment: lips, teeth, and gum same as pre-op    Additional Comments  LMA placed without difficulty, ventilation with assist, equal breath sounds and symmetric chest rise and fall

## 2021-01-19 ENCOUNTER — OFFICE VISIT (OUTPATIENT)
Dept: INTERNAL MEDICINE | Facility: CLINIC | Age: 56
End: 2021-01-19

## 2021-01-19 VITALS
OXYGEN SATURATION: 100 % | DIASTOLIC BLOOD PRESSURE: 72 MMHG | BODY MASS INDEX: 25.66 KG/M2 | SYSTOLIC BLOOD PRESSURE: 126 MMHG | WEIGHT: 154 LBS | HEART RATE: 92 BPM | HEIGHT: 65 IN | TEMPERATURE: 97.5 F

## 2021-01-19 DIAGNOSIS — G47.9 SLEEP DISTURBANCE: ICD-10-CM

## 2021-01-19 DIAGNOSIS — N39.0 RECURRENT URINARY TRACT INFECTION: ICD-10-CM

## 2021-01-19 DIAGNOSIS — G89.29 CHRONIC PAIN OF MULTIPLE JOINTS: Primary | ICD-10-CM

## 2021-01-19 DIAGNOSIS — M25.50 CHRONIC PAIN OF MULTIPLE JOINTS: Primary | ICD-10-CM

## 2021-01-19 DIAGNOSIS — G43.909 MIGRAINE WITHOUT STATUS MIGRAINOSUS, NOT INTRACTABLE, UNSPECIFIED MIGRAINE TYPE: ICD-10-CM

## 2021-01-19 DIAGNOSIS — Z79.899 CONTROLLED SUBSTANCE AGREEMENT SIGNED: ICD-10-CM

## 2021-01-19 DIAGNOSIS — M62.838 MUSCLE SPASM: ICD-10-CM

## 2021-01-19 DIAGNOSIS — G93.31 POSTVIRAL FATIGUE SYNDROME: ICD-10-CM

## 2021-01-19 PROBLEM — R44.2 OLFACTORY HALLUCINATION: Status: ACTIVE | Noted: 2021-01-19

## 2021-01-19 PROCEDURE — 99214 OFFICE O/P EST MOD 30 MIN: CPT | Performed by: NURSE PRACTITIONER

## 2021-01-19 RX ORDER — METHENAMINE HIPPURATE 1000 MG/1
1 TABLET ORAL 2 TIMES DAILY WITH MEALS
COMMUNITY
End: 2021-12-15

## 2021-01-19 RX ORDER — ESZOPICLONE 2 MG/1
2 TABLET, FILM COATED ORAL NIGHTLY
Qty: 30 TABLET | Refills: 1 | Status: CANCELLED | OUTPATIENT
Start: 2021-01-19

## 2021-01-19 RX ORDER — METHOCARBAMOL 750 MG/1
750 TABLET, FILM COATED ORAL 4 TIMES DAILY PRN
Qty: 60 TABLET | Refills: 0 | Status: SHIPPED | OUTPATIENT
Start: 2021-01-19 | End: 2022-08-03

## 2021-01-19 RX ORDER — RIZATRIPTAN BENZOATE 10 MG/1
10 TABLET ORAL ONCE AS NEEDED
Qty: 90 TABLET | Refills: 0 | Status: SHIPPED | OUTPATIENT
Start: 2021-01-19 | End: 2021-07-13

## 2021-01-19 RX ORDER — ESZOPICLONE 2 MG/1
2 TABLET, FILM COATED ORAL NIGHTLY
Qty: 30 TABLET | Refills: 1 | Status: SHIPPED | OUTPATIENT
Start: 2021-01-19 | End: 2021-12-15 | Stop reason: SDUPTHER

## 2021-01-19 NOTE — PROGRESS NOTES
Date: 2021    Name: Britni Raphael  : 1965    Chief Complaint:   Chief Complaint   Patient presents with   • Med Refill     pt states that she is having post covid fatigue, arthritis       HPI:  Britni Raphael is a 55 y.o. female presents with post-viral fatigue after having COVID. She is concerned because it has lasted 2-3 months.  She continues to have days when she doesn't feel like doing anything, is very tired.  A colleague has been prescribed ritalin for post-viral syndrome; Cindy is asking if we have a provider who specializes in this at [facility].  Continues to have decreased sense of smell.  She has been increased her caffeine intake without appreciable increase in energy.  No fever, chills, cough, SOA, rash, nasal congestion, headache.  When asked if she was experiencing myalgia, said no.  Upon reflection, admits when she takes robaxin she sleeps better and is more comfortable.      She has arthritis in her hands, hips, shoulders.  A first cousin has RA.  Significant FH of OA.  She is unable to fully clench her left fist, due to pain and stiffness; is left handed.  Stiffness worse upon awakening. Has to stretch for at least 30 minutes before stiffness is reduced in the mornings.  Does not know if stiffness lasts < 30 minutes or longer.  She has had DEEDEE drawn in the past, negative.  Unable to tolerate PO or topical NSAIDS; cause GERD to worsen.  She takes several supplements to reduce inflammation in joints. Eats a healthy diet.  Normally very active. has been unable to exercise, except Pilates for the past 2-3 months. Stiffness and pain have begun to impact her life more over the past year.    Requesting refill of Lunesta.  Does not take every night. It is effective when needed for insomnia.      She is currently being treated for UTI.  Started taking hiprex daily a few weeks ago. Is also taking an antibiotic prescribed by urology.  She was prescribed pain mediation, took a percocet last  "week.  Currently denies urinary frequency, urgency, dysuria, pelvic pressure, lower back pain, abdominal pain.      Answers for HPI/ROS submitted by the patient on 1/18/2021   What is the primary reason for your visit?: Other  Please describe your symptoms.: Fatigue post Covid which was resolving then returned post first Covid vaccine injection., Arthritis  Have you had these symptoms before?: Yes  How long have you been having these symptoms?: Greater than 2 weeks  Please list any medications you are currently taking for this condition.: Fatigue - caffeine only, Arthritis- eliminating gluten, just restarted fish oil and CBD oil. Cannot do NSAIDs, either oral or topical, as they both cause my Gerd to flare. I take a joint capsule with chondroitin, glucosamine, vitamin C, MSM, Hesperidin, and Rutin in it. Use [employer] cream topically.  Please describe any probable cause for these symptoms. : Fatigue - see above, Arthritis - hereditary    History: The following portions of the patient's history were reviewed and updated as appropriate: allergies, current medications, past medical history, family history, surgical history, social history and problem list.        VS:  Vitals:    01/19/21 1014   BP: 126/72   Pulse: 92   Temp: 97.5 °F (36.4 °C)   TempSrc: Infrared   SpO2: 100%   Weight: 69.9 kg (154 lb)   Height: 165.1 cm (65\")     Body mass index is 25.63 kg/m².  PE:  Physical Exam  Constitutional:       Appearance: She is not ill-appearing.   HENT:      Head: Normocephalic.      Right Ear: External ear normal.      Left Ear: External ear normal.   Eyes:      Conjunctiva/sclera: Conjunctivae normal.      Pupils: Pupils are equal, round, and reactive to light.   Neck:      Musculoskeletal: Normal range of motion and neck supple.   Cardiovascular:      Rate and Rhythm: Normal rate and regular rhythm.      Pulses: Normal pulses.      Heart sounds: Normal heart sounds.   Pulmonary:      Effort: Pulmonary effort is " normal.      Breath sounds: Normal breath sounds.   Musculoskeletal:      Comments: Swelling of interphalangeal joints bilateral hands.  strength equal bilaterally.   No tenderness to palpation of hands, shoulders, knees.  Left knee crepitus noted.     Skin:     General: Skin is warm.      Capillary Refill: Capillary refill takes less than 2 seconds.   Neurological:      Mental Status: She is alert and oriented to person, place, and time.      Coordination: Coordination normal.      Gait: Gait normal.   Psychiatric:         Mood and Affect: Mood normal.         Behavior: Behavior normal.         Thought Content: Thought content normal.         Assessment/Plan:  Diagnoses and all orders for this visit:    1. Chronic pain of multiple joints (Primary)  -     Ambulatory Referral to Rheumatology.  Advised imaging tests and blood work may be needed.  She has has XR's of knee at ortho, will sign AUDREY for us to obtain a copy to send with referral if needed  - May continue multiple supplements, if effective.   - Continue stretching in the mornings  - Try yoga to increase flexibility     2. Sleep disturbance         - eszopiclone (LUNESTA) 2 MG tablet; Take 1 tablet by mouth Every Night. Take immediately before bedtime  Dispense: 30 tablet; Refill: 1    3. Controlled substance agreement signed  -     Compliance Drug Analysis, Ur - Urine, Clean Catch    4. Recurrent urinary tract infection        - Continue antibiotics as prescribed by urology    5. Migraine without status migrainosus, not intractable, unspecified migraine type  -     rizatriptan (Maxalt) 10 MG tablet; Take 1 tablet by mouth 1 (One) Time As Needed for Migraine.  Dispense: 90 tablet; Refill: 0  - Avoid known triggers    6. Muscle spasm  -     methocarbamol (ROBAXIN) 750 MG tablet; Take 1 tablet by mouth 4 (Four) Times a Day As Needed for Muscle Spasms.  Dispense: 60 tablet; Refill: 0    7.  Postviral fatigue syndrome        - Encouraged to rest as much as  possible.        -  Stay well hydrated, well nourished        - Yoga, as mentioned previously        - Advised ritalin may provide energy, increase concentration but is not a recommended treatment for postviral fatigue syndrome.            Return for Next scheduled follow up.

## 2021-01-19 NOTE — TELEPHONE ENCOUNTER
Patient requesting refill.  UDS obtained.  FARHEEN reviewed. Controlled Substance Contract obtained today.

## 2021-01-25 LAB — DRUGS UR: NORMAL

## 2021-01-29 ENCOUNTER — TELEPHONE (OUTPATIENT)
Dept: INTERNAL MEDICINE | Facility: CLINIC | Age: 56
End: 2021-01-29

## 2021-02-09 DIAGNOSIS — G43.909 MIGRAINE WITHOUT STATUS MIGRAINOSUS, NOT INTRACTABLE, UNSPECIFIED MIGRAINE TYPE: ICD-10-CM

## 2021-02-09 RX ORDER — RIZATRIPTAN BENZOATE 10 MG/1
10 TABLET ORAL ONCE AS NEEDED
Qty: 90 TABLET | Refills: 0 | OUTPATIENT
Start: 2021-02-09

## 2021-02-09 NOTE — TELEPHONE ENCOUNTER
Caller: Britni Raphael    Relationship: Self    Best call back number: [phone]    Medication needed:   Requested Prescriptions     Pending Prescriptions Disp Refills   • rizatriptan (Maxalt) 10 MG tablet 90 tablet 0     Sig: Take 1 tablet by mouth 1 (One) Time As Needed for Migraine.       When do you need the refill by: 02/09/2021    What details did the patient provide when requesting the medication: Patient states that her last refill was written for 90 days instead of 9. Patient was given only three at the pharmacy, but she does not have any refills. Patient is asking for this prescription be re-sent in for 9 days with refills so she only has to pay for a copay after 9 days supply instead of 3.     Does the patient have less than a 3 day supply:  [x] Yes  [] No    What is the patient's preferred pharmacy: [id number] [place], [place] - [address] [facility] [address] - [phone]  - [phone]

## 2021-07-13 DIAGNOSIS — G43.909 MIGRAINE WITHOUT STATUS MIGRAINOSUS, NOT INTRACTABLE, UNSPECIFIED MIGRAINE TYPE: ICD-10-CM

## 2021-07-13 RX ORDER — RIZATRIPTAN BENZOATE 10 MG/1
TABLET ORAL
Qty: 90 TABLET | Refills: 2 | Status: SHIPPED | OUTPATIENT
Start: 2021-07-13 | End: 2021-12-15 | Stop reason: SDUPTHER

## 2021-09-15 ENCOUNTER — TRANSCRIBE ORDERS (OUTPATIENT)
Dept: ADMINISTRATIVE | Facility: HOSPITAL | Age: 56
End: 2021-09-15

## 2021-09-15 DIAGNOSIS — Z12.31 VISIT FOR SCREENING MAMMOGRAM: Primary | ICD-10-CM

## 2021-10-20 ENCOUNTER — HOSPITAL ENCOUNTER (OUTPATIENT)
Dept: MAMMOGRAPHY | Facility: HOSPITAL | Age: 56
Discharge: HOME | End: 2021-10-20

## 2021-10-20 DIAGNOSIS — Z12.31 VISIT FOR SCREENING MAMMOGRAM: ICD-10-CM

## 2021-12-01 ENCOUNTER — HOSPITAL ENCOUNTER (OUTPATIENT)
Dept: MAMMOGRAPHY | Facility: HOSPITAL | Age: 56
Discharge: HOME | End: 2021-12-01
Admitting: OBSTETRICS & GYNECOLOGY

## 2021-12-01 PROCEDURE — 77063 BREAST TOMOSYNTHESIS BI: CPT

## 2021-12-01 PROCEDURE — 77063 BREAST TOMOSYNTHESIS BI: CPT | Performed by: RADIOLOGY

## 2021-12-01 PROCEDURE — 77067 SCR MAMMO BI INCL CAD: CPT | Performed by: RADIOLOGY

## 2021-12-01 PROCEDURE — 77067 SCR MAMMO BI INCL CAD: CPT

## 2021-12-15 ENCOUNTER — OFFICE VISIT (OUTPATIENT)
Dept: INTERNAL MEDICINE | Facility: CLINIC | Age: 56
End: 2021-12-15

## 2021-12-15 VITALS
TEMPERATURE: 97.5 F | HEART RATE: 94 BPM | HEIGHT: 65 IN | OXYGEN SATURATION: 97 % | DIASTOLIC BLOOD PRESSURE: 70 MMHG | SYSTOLIC BLOOD PRESSURE: 118 MMHG | BODY MASS INDEX: 24.99 KG/M2 | WEIGHT: 150 LBS

## 2021-12-15 DIAGNOSIS — G47.9 SLEEP DISTURBANCE: ICD-10-CM

## 2021-12-15 DIAGNOSIS — J30.89 ENVIRONMENTAL AND SEASONAL ALLERGIES: ICD-10-CM

## 2021-12-15 DIAGNOSIS — G43.909 MIGRAINE WITHOUT STATUS MIGRAINOSUS, NOT INTRACTABLE, UNSPECIFIED MIGRAINE TYPE: Primary | ICD-10-CM

## 2021-12-15 PROCEDURE — 99214 OFFICE O/P EST MOD 30 MIN: CPT | Performed by: NURSE PRACTITIONER

## 2021-12-15 RX ORDER — SULFASALAZINE 500 MG/1
TABLET ORAL
COMMUNITY
Start: 2021-12-08 | End: 2022-08-03

## 2021-12-15 RX ORDER — ESZOPICLONE 2 MG/1
2 TABLET, FILM COATED ORAL NIGHTLY
Qty: 30 TABLET | Refills: 1 | Status: SHIPPED | OUTPATIENT
Start: 2021-12-15 | End: 2022-08-03 | Stop reason: SDUPTHER

## 2021-12-15 RX ORDER — RIZATRIPTAN BENZOATE 10 MG/1
10 TABLET ORAL ONCE AS NEEDED
Qty: 90 TABLET | Refills: 3 | Status: SHIPPED | OUTPATIENT
Start: 2021-12-15 | End: 2022-08-03

## 2021-12-15 RX ORDER — PSEUDOEPHEDRINE HCL 30 MG
30 TABLET ORAL EVERY 4 HOURS PRN
Qty: 90 TABLET | Refills: 3 | Status: SHIPPED | OUTPATIENT
Start: 2021-12-15 | End: 2022-08-03 | Stop reason: SDUPTHER

## 2021-12-15 NOTE — PROGRESS NOTES
Office Visit      Patient Name: Britni Raphael  : 1965   MRN: [id number]     Chief Complaint:    Chief Complaint   Patient presents with   • Med Refill       History of Present Illness: Britni Raphael is a 56 y.o. female who is here today with request for medication refills.    Migraines: Takes Maxalt when needed for migraines, it is effective.  Typically has <4 migraines a month.  No change in headache symptoms, intensity or frequency of migraine headaches in the past year.  She has environmental and seasonal allergies.  Takes daily antihistamine during peak allergy seasons.  Occasionally needs Sudafed.  Requesting prescription for this medication.  Has occasional insomnia, takes Lunesta when needed.  Last fill was on 2029, for 30 tablets.  UDS on 2021.        Subjective      I have reviewed and the following portions of the patient's history were updated as appropriate: past family history, past medical history, past social history, past surgical history and problem list.      Current Outpatient Medications:   •  estradiol (EVAMIST) 1.53 MG/SPRAY transdermal spray, Place 1 spray on the skin as directed by provider Daily., Disp: , Rfl:   •  estradiol (VAGIFEM) 10 MCG tablet vaginal tablet, Insert 1 tablet into the vagina 2 (Two) Times a Week., Disp: , Rfl:   •  eszopiclone (LUNESTA) 2 MG tablet, Take 1 tablet by mouth Every Night. Take immediately before bedtime, Disp: 30 tablet, Rfl: 1  •  ipratropium (ATROVENT) 0.06 % nasal spray, 1 spray into the nostril(s) as directed by provider Daily., Disp: , Rfl:   •  methocarbamol (ROBAXIN) 750 MG tablet, Take 1 tablet by mouth 4 (Four) Times a Day As Needed for Muscle Spasms., Disp: 60 tablet, Rfl: 0  •  progesterone (PROMETRIUM) 100 MG capsule, Take 100 mg by mouth Every Evening., Disp: , Rfl:   •  rizatriptan (MAXALT) 10 MG tablet, Take 1 tablet by mouth 1 (One) Time As Needed for Migraine. May repeat in 2 hours if needed, Disp: 90 tablet,  "Rfl: 3  •  sulfaSALAzine (AZULFIDINE) 500 MG tablet, , Disp: , Rfl:   •  VITAMIN D PO, Take  by mouth., Disp: , Rfl:   •  pseudoephedrine (Sudafed) 30 MG tablet, Take 1 tablet by mouth Every 4 (Four) Hours As Needed for Congestion., Disp: 90 tablet, Rfl: 3    Allergies   Allergen Reactions   • Oxycodone-Acetaminophen Itching   • Bactrim [Sulfamethoxazole-Trimethoprim] Other (See Comments)     Mouth ulcers   • Hydrocodone-Acetaminophen Itching   • Codeine Hives   • Hydrocodone-Acetaminophen Itching   • Lubricating Jelly  [Cream Base] Rash   • Nitrofurantoin Hives       Objective     Physical Exam:  Vital Signs:   Vitals:    12/15/21 0917   BP: 118/70   Pulse: 94   Temp: 97.5 °F (36.4 °C)   TempSrc: Infrared   SpO2: 97%   Weight: 68 kg (150 lb)   Height: 165.1 cm (65\")     Body mass index is 24.96 kg/m².    Physical Exam  Constitutional:       Appearance: She is not ill-appearing.   HENT:      Head: Normocephalic.      Right Ear: External ear normal.      Left Ear: External ear normal.   Eyes:      Conjunctiva/sclera: Conjunctivae normal.      Pupils: Pupils are equal, round, and reactive to light.   Cardiovascular:      Rate and Rhythm: Normal rate and regular rhythm.      Heart sounds: Normal heart sounds.   Pulmonary:      Effort: Pulmonary effort is normal.      Breath sounds: Normal breath sounds.   Musculoskeletal:      Cervical back: Normal range of motion and neck supple.   Skin:     General: Skin is warm.      Capillary Refill: Capillary refill takes less than 2 seconds.   Neurological:      Mental Status: She is alert and oriented to person, place, and time.      Coordination: Coordination normal.      Gait: Gait normal.   Psychiatric:         Mood and Affect: Mood normal.         Behavior: Behavior normal.         Thought Content: Thought content normal.       Assessment / Plan      Assessment/Plan:   Diagnoses and all orders for this visit:    1. Migraine without status migrainosus, not intractable, " unspecified migraine type (Primary)  -     rizatriptan (MAXALT) 10 MG tablet; Take 1 tablet by mouth 1 (One) Time As Needed for Migraine. May repeat in 2 hours if needed  Dispense: 90 tablet; Refill: 3  - Avoid known triggers when possible  - Monitor for increased frequency, severity of migraines.  Return to clinic should headache pattern change.    2. Environmental and seasonal allergies  -     pseudoephedrine (Sudafed) 30 MG tablet; Take 1 tablet by mouth Every 4 (Four) Hours As Needed for Congestion.  Dispense: 90 tablet; Refill: 3        - Avoid known allergy triggers when possible, wash hands and face after exposure.  Consider changing clothes.        - HEPA filter in vacuum and HVAC        - Change/launder bed linens at least once a week        - Vacuum carpeted areas in the home, 2-3 times a week    3. Sleep disturbance  -     eszopiclone (LUNESTA) 2 MG tablet; Take 1 tablet by mouth Every Night. Take immediately before bedtime  Dispense: 30 tablet; Refill: 1        - Calm, cool, dark, tidy bedroom spacer sleep        - No screen time for 1 to 2 hours before bed, avoid caffeine after 5 PM.    Follow Up:   Return for Annual.    Patient was given instructions and counseling regarding her condition or for health maintenance advice. Please see specific information pulled into the AVS if appropriate.       [facility]     Please note that portions of this note may have been completed with a voice recognition program. Efforts were made to edit dictation, but occasionally words are mistranscribed.

## 2022-04-23 ENCOUNTER — OFFICE VISIT (OUTPATIENT)
Dept: INTERNAL MEDICINE | Facility: CLINIC | Age: 57
End: 2022-04-23

## 2022-04-23 VITALS
BODY MASS INDEX: 24.99 KG/M2 | TEMPERATURE: 97.8 F | DIASTOLIC BLOOD PRESSURE: 78 MMHG | HEIGHT: 65 IN | SYSTOLIC BLOOD PRESSURE: 122 MMHG | WEIGHT: 150 LBS | HEART RATE: 105 BPM | OXYGEN SATURATION: 99 %

## 2022-04-23 DIAGNOSIS — M19.90 ARTHRITIS: ICD-10-CM

## 2022-04-23 DIAGNOSIS — R10.13 EPIGASTRIC PAIN: ICD-10-CM

## 2022-04-23 DIAGNOSIS — K27.9 PUD (PEPTIC ULCER DISEASE): Primary | ICD-10-CM

## 2022-04-23 PROCEDURE — 99214 OFFICE O/P EST MOD 30 MIN: CPT | Performed by: INTERNAL MEDICINE

## 2022-04-23 RX ORDER — PROGESTERONE 100 MG/1
CAPSULE ORAL
COMMUNITY
Start: 2022-04-10 | End: 2022-08-03

## 2022-04-23 RX ORDER — TRIAMCINOLONE ACETONIDE 1 MG/G
CREAM TOPICAL
COMMUNITY

## 2022-04-23 RX ORDER — SUCRALFATE 1 G/1
1 TABLET ORAL 4 TIMES DAILY
Qty: 60 TABLET | Refills: 0 | Status: SHIPPED | OUTPATIENT
Start: 2022-04-23 | End: 2022-08-03

## 2022-04-23 RX ORDER — CEPHALEXIN 500 MG/1
CAPSULE ORAL
COMMUNITY
Start: 2021-12-11 | End: 2022-11-09 | Stop reason: SDUPTHER

## 2022-04-23 RX ORDER — MONTELUKAST SODIUM 10 MG/1
TABLET ORAL
COMMUNITY
End: 2022-08-03 | Stop reason: SDUPTHER

## 2022-04-23 NOTE — PROGRESS NOTES
Subjective   Britni Raphael is a 56 y.o. female.     Chief Complaint   Patient presents with   • Follow-up     Possible Ulcer. Onset 3/28/2022. Rheumatologist double her medication.        History of Present Illness   Mid abd pain 3/28 off and on when empty or full stomach. On prevacid now.gassy and mild loose stool. No radiation pain. No dysphagia. Occasional nausea. Patient has reactive arthritis patient is seen rheumatologist. Patient states after the sulfasalazine increased by rheumatologist patient started to have stomach discomfort abdominal pain.    Current Outpatient Medications:   •  cephalexin (KEFLEX) 500 MG capsule, cephalexin 500 mg capsule, Disp: , Rfl:   •  estradiol (EVAMIST) 1.53 MG/SPRAY transdermal spray, Place 1 spray on the skin as directed by provider Daily., Disp: , Rfl:   •  estradiol (VAGIFEM) 10 MCG tablet vaginal tablet, Insert 1 tablet into the vagina 2 (Two) Times a Week., Disp: , Rfl:   •  eszopiclone (LUNESTA) 2 MG tablet, Take 1 tablet by mouth Every Night. Take immediately before bedtime, Disp: 30 tablet, Rfl: 1  •  montelukast (SINGULAIR) 10 MG tablet, montelukast 10 mg tablet, Disp: , Rfl:   •  Progesterone (PROMETRIUM) 100 MG capsule, , Disp: , Rfl:   •  pseudoephedrine (Sudafed) 30 MG tablet, Take 1 tablet by mouth Every 4 (Four) Hours As Needed for Congestion., Disp: 90 tablet, Rfl: 3  •  rizatriptan (MAXALT) 10 MG tablet, Take 1 tablet by mouth 1 (One) Time As Needed for Migraine. May repeat in 2 hours if needed, Disp: 90 tablet, Rfl: 3  •  sulfaSALAzine (AZULFIDINE) 500 MG tablet, , Disp: , Rfl:   •  triamcinolone (KENALOG) 0.1 % cream, triamcinolone acetonide 0.1 % topical cream, Disp: , Rfl:   •  ipratropium (ATROVENT) 0.06 % nasal spray, 1 spray into the nostril(s) as directed by provider Daily., Disp: , Rfl:   •  methocarbamol (ROBAXIN) 750 MG tablet, Take 1 tablet by mouth 4 (Four) Times a Day As Needed for Muscle Spasms. (Patient taking differently: Take  by mouth 4  (Four) Times a Day As Needed for Muscle Spasms.), Disp: 60 tablet, Rfl: 0  •  progesterone (PROMETRIUM) 100 MG capsule, Take 100 mg by mouth Every Evening., Disp: , Rfl:   •  sucralfate (Carafate) 1 g tablet, Take 1 tablet by mouth 4 (Four) Times a Day., Disp: 60 tablet, Rfl: 0  •  VITAMIN D PO, Take  by mouth., Disp: , Rfl:     The following portions of the patient's history were reviewed and updated as appropriate: allergies, current medications, past family history, past medical history, past social history, past surgical history and problem list.    Review of Systems   Constitutional: Negative.    Respiratory: Negative.    Cardiovascular: Negative.    Gastrointestinal: Positive for abdominal pain.   Musculoskeletal: Positive for arthralgias.   Skin: Negative.    Neurological: Negative.    Psychiatric/Behavioral: Negative.        Objective   Physical Exam  Cardiovascular:      Rate and Rhythm: Normal rate and regular rhythm.      Heart sounds: Normal heart sounds.   Pulmonary:      Effort: Pulmonary effort is normal.      Breath sounds: Normal breath sounds.   Abdominal:      General: Bowel sounds are normal. There is no distension.      Palpations: There is no mass.      Tenderness: Tenderness: mild. There is no guarding or rebound.   Musculoskeletal:      Cervical back: Neck supple.   Skin:     General: Skin is warm.   Neurological:      Mental Status: She is alert and oriented to person, place, and time.         All tests have been reviewed.    Assessment/Plan   Diagnoses and all orders for this visit:    PUD (peptic ulcer disease) initiate Carafate continue continue PPI refer to [facility] CBC CMP unremarkable  -     Ambulatory Referral to Gastroenterology  -     TSH  -     Lipid Panel  -     T4, Free  -     Vitamin D 25 hydroxy    Arthritis follow-up with arthritis  sulfasalazine dose increase caused abdominal pain  -     Thyroid Peroxidase Antibody    Epigastric pain we will also rule out gallbladder  disease  -     US Abdomen Complete  -     TSH  -     Lipid Panel  -     T4, Free  -     Vitamin D 25 hydroxy    Other orders  -     cephalexin (KEFLEX) 500 MG capsule; cephalexin 500 mg capsule  -     triamcinolone (KENALOG) 0.1 % cream; triamcinolone acetonide 0.1 % topical cream  -     Progesterone (PROMETRIUM) 100 MG capsule  -     montelukast (SINGULAIR) 10 MG tablet; montelukast 10 mg tablet  -     sucralfate (Carafate) 1 g tablet; Take 1 tablet by mouth 4 (Four) Times a Day.             1 mo PE after blood tests. CBC and CMP were done

## 2022-05-03 ENCOUNTER — TELEPHONE (OUTPATIENT)
Dept: INTERNAL MEDICINE | Facility: CLINIC | Age: 57
End: 2022-05-03

## 2022-05-03 NOTE — TELEPHONE ENCOUNTER
Caller: Britni Raphael    Relationship to patient: Self    Best call back number: [phone]    Date of positive COVID19 test: 5/3/22: 2 AT HOME TESTS AND GETTING PCR THIS MORNING    COVID19 symptoms: FEVER, BODY ACHES, HEADACHE, COUGH     Additional information or concerns: PATIENT IS ASKING FOR IMMUNOGLOBULIN TREATMENT     What is the patients preferred pharmacy: [facility]

## 2022-05-09 NOTE — TELEPHONE ENCOUNTER
Attempted to contact patient; left detailed message per release asking her to contact clinic if she needed a follow up with PCP

## 2022-05-21 ENCOUNTER — HOSPITAL ENCOUNTER (OUTPATIENT)
Dept: ULTRASOUND IMAGING | Facility: HOSPITAL | Age: 57
Discharge: HOME | End: 2022-05-21
Admitting: INTERNAL MEDICINE

## 2022-05-21 PROCEDURE — 76700 US EXAM ABDOM COMPLETE: CPT

## 2022-06-06 ENCOUNTER — APPOINTMENT (OUTPATIENT)
Dept: PREADMISSION TESTING | Facility: HOSPITAL | Age: 57
End: 2022-06-06

## 2022-06-09 ENCOUNTER — OUTSIDE FACILITY SERVICE (OUTPATIENT)
Dept: GASTROENTEROLOGY | Facility: CLINIC | Age: 57
End: 2022-06-09

## 2022-06-09 PROCEDURE — 43249 ESOPH EGD DILATION <30 MM: CPT | Performed by: INTERNAL MEDICINE

## 2022-06-09 PROCEDURE — 43239 EGD BIOPSY SINGLE/MULTIPLE: CPT | Performed by: INTERNAL MEDICINE

## 2022-06-09 PROCEDURE — 88305 TISSUE EXAM BY PATHOLOGIST: CPT | Performed by: INTERNAL MEDICINE

## 2022-06-10 ENCOUNTER — LAB REQUISITION (OUTPATIENT)
Dept: LAB | Facility: HOSPITAL | Age: 57
End: 2022-06-10

## 2022-06-10 DIAGNOSIS — R13.10 DYSPHAGIA, UNSPECIFIED: ICD-10-CM

## 2022-06-13 ENCOUNTER — TELEPHONE (OUTPATIENT)
Dept: GASTROENTEROLOGY | Facility: CLINIC | Age: 57
End: 2022-06-13

## 2022-06-13 NOTE — TELEPHONE ENCOUNTER
----- Message from Michoacano Parks MD sent at 6/13/2022 10:45 AM EDT -----  Please let Britni know she does not have h. pylori

## 2022-06-13 NOTE — TELEPHONE ENCOUNTER
I tried to call Ms Raphael to give biopsy results. No answer; left voice message. I will send result letter to my chart and the mail.

## 2022-08-03 ENCOUNTER — OFFICE VISIT (OUTPATIENT)
Dept: INTERNAL MEDICINE | Facility: CLINIC | Age: 57
End: 2022-08-03

## 2022-08-03 ENCOUNTER — LAB (OUTPATIENT)
Dept: LAB | Facility: HOSPITAL | Age: 57
End: 2022-08-03

## 2022-08-03 VITALS
HEIGHT: 65 IN | WEIGHT: 150 LBS | OXYGEN SATURATION: 97 % | HEART RATE: 76 BPM | DIASTOLIC BLOOD PRESSURE: 70 MMHG | TEMPERATURE: 97.1 F | SYSTOLIC BLOOD PRESSURE: 118 MMHG | BODY MASS INDEX: 24.99 KG/M2

## 2022-08-03 DIAGNOSIS — G43.809 OTHER MIGRAINE WITHOUT STATUS MIGRAINOSUS, NOT INTRACTABLE: Primary | ICD-10-CM

## 2022-08-03 DIAGNOSIS — G47.9 SLEEP DISTURBANCE: ICD-10-CM

## 2022-08-03 DIAGNOSIS — M47.819 SPONDYLOARTHROPATHY: ICD-10-CM

## 2022-08-03 DIAGNOSIS — Z77.018 EXPOSURE TO MERCURY: ICD-10-CM

## 2022-08-03 DIAGNOSIS — Z13.220 LIPID SCREENING: ICD-10-CM

## 2022-08-03 DIAGNOSIS — J30.89 ENVIRONMENTAL AND SEASONAL ALLERGIES: ICD-10-CM

## 2022-08-03 DIAGNOSIS — Z13.21 ENCOUNTER FOR VITAMIN DEFICIENCY SCREENING: ICD-10-CM

## 2022-08-03 DIAGNOSIS — T78.40XA ALLERGY, INITIAL ENCOUNTER: ICD-10-CM

## 2022-08-03 PROBLEM — R44.2 OLFACTORY HALLUCINATION: Status: RESOLVED | Noted: 2021-01-19 | Resolved: 2022-08-03

## 2022-08-03 LAB
25(OH)D3 SERPL-MCNC: 57.4 NG/ML (ref 30–100)
CHOLEST SERPL-MCNC: 186 MG/DL (ref 0–200)
HDLC SERPL-MCNC: 90 MG/DL (ref 40–60)
LDLC SERPL CALC-MCNC: 89 MG/DL (ref 0–100)
LDLC/HDLC SERPL: 1 {RATIO}
TRIGL SERPL-MCNC: 32 MG/DL (ref 0–150)
VLDLC SERPL-MCNC: 7 MG/DL (ref 5–40)

## 2022-08-03 PROCEDURE — 83825 ASSAY OF MERCURY: CPT | Performed by: INTERNAL MEDICINE

## 2022-08-03 PROCEDURE — 80061 LIPID PANEL: CPT | Performed by: INTERNAL MEDICINE

## 2022-08-03 PROCEDURE — 82306 VITAMIN D 25 HYDROXY: CPT | Performed by: INTERNAL MEDICINE

## 2022-08-03 PROCEDURE — 99214 OFFICE O/P EST MOD 30 MIN: CPT | Performed by: INTERNAL MEDICINE

## 2022-08-03 RX ORDER — ADALIMUMAB 40MG/0.4ML
40 KIT SUBCUTANEOUS
COMMUNITY
Start: 2022-07-06

## 2022-08-03 RX ORDER — METHOCARBAMOL 750 MG/1
TABLET, FILM COATED ORAL
COMMUNITY
Start: 2021-01-19 | End: 2022-08-03 | Stop reason: SDUPTHER

## 2022-08-03 RX ORDER — FROVATRIPTAN SUCCINATE 2.5 MG/1
2.5 TABLET, FILM COATED ORAL DAILY PRN
Qty: 30 TABLET | Refills: 5 | Status: SHIPPED | OUTPATIENT
Start: 2022-08-03 | End: 2023-03-29

## 2022-08-03 RX ORDER — MONTELUKAST SODIUM 10 MG/1
10 TABLET ORAL NIGHTLY
Qty: 30 TABLET | Refills: 11 | Status: SHIPPED | OUTPATIENT
Start: 2022-08-03

## 2022-08-03 RX ORDER — ESZOPICLONE 2 MG/1
2 TABLET, FILM COATED ORAL NIGHTLY
Qty: 30 TABLET | Refills: 1 | Status: SHIPPED | OUTPATIENT
Start: 2022-08-03

## 2022-08-03 RX ORDER — PSEUDOEPHEDRINE HCL 30 MG
30 TABLET ORAL EVERY 4 HOURS PRN
Qty: 30 TABLET | Refills: 11 | Status: SHIPPED | OUTPATIENT
Start: 2022-08-03 | End: 2022-11-09 | Stop reason: SDUPTHER

## 2022-08-03 RX ORDER — METHOCARBAMOL 750 MG/1
750 TABLET, FILM COATED ORAL 3 TIMES DAILY
Qty: 90 TABLET | Refills: 11 | Status: SHIPPED | OUTPATIENT
Start: 2022-08-03

## 2022-08-03 NOTE — PROGRESS NOTES
Subjective   Britni Raphael is a 57 y.o. female here to establish care for migraine, spondyloarthropathy, allergies, insomnia.  Patient currently on rizatriptan, would like to try frovatriptan as it worked better for her in the past.  Tried Zomig which burned her nose.  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  She takes it every 14 days and says by day 13 she is in a lot of pain.  Her pain is mostly in hips and low back.  She would like refill on methocarbamol which helps with muscle spasms.  She has allergies, takes Singulair and Sudafed from time to time.  She also takes Lunesta 2 mg for insomnia, intermittently.  She eats a fair amount of fish, had a high mercury level in the past but stopped eating as much vision levels did go down.  She wonders if she needs to check this on a yearly basis.    Review of Systems   Constitutional: Negative.    HENT: Negative.    Eyes: Negative.    Respiratory: Negative.    Cardiovascular: Negative.    Gastrointestinal: Negative.    Endocrine: Negative.    Genitourinary: Negative.    Musculoskeletal: Positive for arthralgias.   Skin: Negative.    Allergic/Immunologic: Negative.    Neurological: Negative.    Hematological: Negative.    Psychiatric/Behavioral: Negative.        Past Medical History:   Diagnosis Date   • Arthritis    • Back pain    • Esophageal reflux    • Headache    • Hyperthyroidism    • Insomnia    • Kidney stone 07/2016   • Leukopenia    • Menopause    • Migraine headache    • PONV (postoperative nausea and vomiting)    • Vision problem      Family History   Problem Relation Age of Onset   • Heart attack Maternal Grandfather    • Heart disease Maternal Grandfather    • Heart attack Other         acute myocardial infarction   • Arthritis Other    • Cancer Other    • Diabetes Other    • Hypothyroidism Other    • Obesity Other    • Osteoarthritis Other    • Osteoporosis Other    • Breast cancer Neg  Hx    • Colon cancer Neg Hx    • Endometrial cancer Neg Hx    • Ovarian cancer Neg Hx      Past Surgical History:   Procedure Laterality Date   • AUGMENTATION MAMMAPLASTY Bilateral 2010   • COLONOSCOPY     • CYSTOSCOPY, URETEROSCOPY, RETROGRADE PYELOGRAM, STONE EXTRACTION, STENT INSERTION Left 2/22/2020    Procedure: CYSTOSCOPY LEFT URETEROSCOPY RETROGRADE PYELOGRAM BASKET STONE EXTRACTION STENT INSERTION LASER STANDBY;  Surgeon: Carlito Davis MD;  Location:  [facility];  Service: Urology;  Laterality: Left;   • EXTRACORPOREAL SHOCK WAVE LITHOTRIPSY (ESWL) Right 11/20/2020    Procedure: EXTRACORPOREAL SHOCKWAVE LITHOTRIPSY RIGHT;  Surgeon: Domo Kim Jr., MD;  Location:  [facility];  Service: Urology;  Laterality: Right;  FLURO 3 MIN 40 SEC   • FOOT SURGERY     • FRACTURE SURGERY     • OOPHORECTOMY Right 2014    CYST   • OTHER SURGICAL HISTORY      Uterine surgery   • TONSILLECTOMY  age 23     Social History     Socioeconomic History   • Marital status:    Tobacco Use   • Smoking status: Never Smoker   • Smokeless tobacco: Never Used   Substance and Sexual Activity   • Alcohol use: Yes     Comment: Pt reoprts social drinking   • Drug use: No   • Sexual activity: Yes     Partners: Male         Current Outpatient Medications:   •  Adalimumab (Humira) 40 MG/0.4ML Prefilled Syringe Kit injection, 40 mg Every 14 (Fourteen) Days., Disp: , Rfl:   •  estradiol (EVAMIST) 1.53 MG/SPRAY transdermal spray, Place 1 spray on the skin as directed by provider Daily., Disp: , Rfl:   •  estradiol (VAGIFEM) 10 MCG tablet vaginal tablet, Insert 1 tablet into the vagina 2 (Two) Times a Week., Disp: , Rfl:   •  eszopiclone (LUNESTA) 2 MG tablet, Take 1 tablet by mouth Every Night. Take immediately before bedtime, Disp: 30 tablet, Rfl: 1  •  methocarbamol (ROBAXIN) 750 MG tablet, , Disp: , Rfl:   •  montelukast (SINGULAIR) 10 MG tablet, montelukast 10 mg tablet, Disp: , Rfl:   •  progesterone (PROMETRIUM) 100 MG  "capsule, Take 100 mg by mouth Every Evening., Disp: , Rfl:   •  pseudoephedrine (Sudafed) 30 MG tablet, Take 1 tablet by mouth Every 4 (Four) Hours As Needed for Congestion., Disp: 90 tablet, Rfl: 3  •  rizatriptan (MAXALT) 10 MG tablet, Take 1 tablet by mouth 1 (One) Time As Needed for Migraine. May repeat in 2 hours if needed, Disp: 90 tablet, Rfl: 3  •  cephalexin (KEFLEX) 500 MG capsule, cephalexin 500 mg capsule, Disp: , Rfl:   •  triamcinolone (KENALOG) 0.1 % cream, triamcinolone acetonide 0.1 % topical cream, Disp: , Rfl:     Objective   /70   Pulse 76   Temp 97.1 °F (36.2 °C) (Temporal)   Ht 165.1 cm (65\")   Wt 68 kg (150 lb)   SpO2 97%   BMI 24.96 kg/m²   Physical Exam  Vitals reviewed.   Constitutional:       Appearance: She is well-developed.   HENT:      Head: Normocephalic and atraumatic.      Nose: Nose normal.   Eyes:      Conjunctiva/sclera: Conjunctivae normal.   Cardiovascular:      Rate and Rhythm: Normal rate and regular rhythm.      Heart sounds: Normal heart sounds. No murmur heard.    No friction rub. No gallop.   Pulmonary:      Effort: Pulmonary effort is normal.      Breath sounds: Normal breath sounds. No wheezing.   Musculoskeletal:      Comments: Normal gait and station   Skin:     General: Skin is warm and dry.   Neurological:      Mental Status: She is alert and oriented to person, place, and time.   Psychiatric:         Behavior: Behavior normal.         Thought Content: Thought content normal.         Judgment: Judgment normal.         Assessment & Plan   Diagnoses and all orders for this visit:    1. Other migraine without status migrainosus, not intractable (Primary)  -     frovatriptan (FROVA) 2.5 MG tablet; Take 1 tablet by mouth Daily As Needed for Migraine for up to 1 dose. If recurs, may repeat after 2 hours. Max of 3 tabs in 24 hours.  Dispense: 30 tablet; Refill: 5    2. Exposure to mercury  -     Mercury, Blood    3. Lipid screening  -     Lipid Panel    4. " Encounter for vitamin deficiency screening  -     Vitamin D 25 Hydroxy    5. Non-radiographic axial Spondyloarthropathy  -     methocarbamol (ROBAXIN) 750 MG tablet; Take 1 tablet by mouth 3 (Three) Times a Day.  Dispense: 90 tablet; Refill: 11    6. Environmental and seasonal allergies  -     pseudoephedrine (Sudafed) 30 MG tablet; Take 1 tablet by mouth Every 4 (Four) Hours As Needed for Congestion.  Dispense: 30 tablet; Refill: 11    7. Sleep disturbance  -     eszopiclone (LUNESTA) 2 MG tablet; Take 1 tablet by mouth Every Night. Take immediately before bedtime  Dispense: 30 tablet; Refill: 1    8. Allergy, initial encounter  -     montelukast (SINGULAIR) 10 MG tablet; Take 1 tablet by mouth Every Night.  Dispense: 30 tablet; Refill: 11  -     pseudoephedrine (Sudafed) 30 MG tablet; Take 1 tablet by mouth Every 4 (Four) Hours As Needed for Congestion.  Dispense: 30 tablet; Refill: 11

## 2022-08-04 ENCOUNTER — PATIENT ROUNDING (BHMG ONLY) (OUTPATIENT)
Dept: INTERNAL MEDICINE | Facility: CLINIC | Age: 57
End: 2022-08-04

## 2022-08-04 NOTE — PROGRESS NOTES
A  my chart message has been sent to the patient for patient rounding with [facility] [place].

## 2022-08-05 LAB — MERCURY BLD-MCNC: 4.9 UG/L (ref 0–14.9)

## 2022-10-19 ENCOUNTER — TRANSCRIBE ORDERS (OUTPATIENT)
Dept: ADMINISTRATIVE | Facility: HOSPITAL | Age: 57
End: 2022-10-19

## 2022-10-19 DIAGNOSIS — Z12.31 VISIT FOR SCREENING MAMMOGRAM: Primary | ICD-10-CM

## 2022-11-09 ENCOUNTER — OFFICE VISIT (OUTPATIENT)
Dept: INTERNAL MEDICINE | Facility: CLINIC | Age: 57
End: 2022-11-09

## 2022-11-09 VITALS
OXYGEN SATURATION: 99 % | BODY MASS INDEX: 26.29 KG/M2 | HEART RATE: 78 BPM | DIASTOLIC BLOOD PRESSURE: 76 MMHG | TEMPERATURE: 97.5 F | SYSTOLIC BLOOD PRESSURE: 116 MMHG | WEIGHT: 154 LBS | HEIGHT: 64 IN

## 2022-11-09 DIAGNOSIS — N39.0 RECURRENT UTI: ICD-10-CM

## 2022-11-09 DIAGNOSIS — Z00.00 HEALTH CARE MAINTENANCE: Primary | ICD-10-CM

## 2022-11-09 DIAGNOSIS — T78.40XA ALLERGY, INITIAL ENCOUNTER: ICD-10-CM

## 2022-11-09 DIAGNOSIS — J30.89 ENVIRONMENTAL AND SEASONAL ALLERGIES: ICD-10-CM

## 2022-11-09 PROCEDURE — 99396 PREV VISIT EST AGE 40-64: CPT | Performed by: INTERNAL MEDICINE

## 2022-11-09 RX ORDER — PSEUDOEPHEDRINE HCL 30 MG
30 TABLET ORAL EVERY 4 HOURS PRN
Qty: 30 TABLET | Refills: 11 | Status: SHIPPED | OUTPATIENT
Start: 2022-11-09 | End: 2023-03-17

## 2022-11-09 RX ORDER — CEPHALEXIN 500 MG/1
500 CAPSULE ORAL 3 TIMES DAILY
Qty: 30 CAPSULE | Refills: 1 | Status: SHIPPED | OUTPATIENT
Start: 2022-11-09 | End: 2023-03-29

## 2022-11-09 NOTE — PROGRESS NOTES
Subjective   Britni Raphael is a 57 y.o. female here for yearly physical.  Just had a cosmetic procedure on her face, so it is swollen and red.  She needs some refills on medications.    Review of Systems   Constitutional: Negative.    HENT: Negative.    Eyes: Negative.    Respiratory: Negative.    Cardiovascular: Negative.    Gastrointestinal: Negative.    Endocrine: Negative.    Genitourinary: Negative.    Musculoskeletal: Negative.    Skin: Positive for color change.   Allergic/Immunologic: Negative.    Neurological: Negative.    Hematological: Negative.    Psychiatric/Behavioral: Negative.           Past Medical History:   Diagnosis Date   • Arthritis    • Back pain    • Esophageal reflux    • Headache    • Hyperthyroidism    • Insomnia    • Kidney stone 07/2016   • Leukopenia    • Menopause    • Migraine headache    • PONV (postoperative nausea and vomiting)    • Vision problem      Family History   Problem Relation Age of Onset   • Heart attack Maternal Grandfather    • Heart disease Maternal Grandfather    • Heart attack Other         acute myocardial infarction   • Arthritis Other    • Cancer Other    • Diabetes Other    • Hypothyroidism Other    • Obesity Other    • Osteoarthritis Other    • Osteoporosis Other    • Breast cancer Neg Hx    • Colon cancer Neg Hx    • Endometrial cancer Neg Hx    • Ovarian cancer Neg Hx      Past Surgical History:   Procedure Laterality Date   • AUGMENTATION MAMMAPLASTY Bilateral 2010   • COLONOSCOPY     • CYSTOSCOPY, URETEROSCOPY, RETROGRADE PYELOGRAM, STONE EXTRACTION, STENT INSERTION Left 2/22/2020    Procedure: CYSTOSCOPY LEFT URETEROSCOPY RETROGRADE PYELOGRAM BASKET STONE EXTRACTION STENT INSERTION LASER STANDBY;  Surgeon: Carlito Davis MD;  Location: [facility];  Service: Urology;  Laterality: Left;   • EXTRACORPOREAL SHOCK WAVE LITHOTRIPSY (ESWL) Right 11/20/2020    Procedure: EXTRACORPOREAL SHOCKWAVE LITHOTRIPSY RIGHT;  Surgeon: Domo Kim Jr., MD;   Location: [facility];  Service: Urology;  Laterality: Right;  FLURO 3 MIN 40 SEC   • FOOT SURGERY     • FRACTURE SURGERY     • OOPHORECTOMY Right 2014    CYST   • OTHER SURGICAL HISTORY      Uterine surgery   • TONSILLECTOMY  age 23     Social History     Socioeconomic History   • Marital status:    Tobacco Use   • Smoking status: Never   • Smokeless tobacco: Never   Substance and Sexual Activity   • Alcohol use: Yes     Comment: Pt reoprts social drinking   • Drug use: No   • Sexual activity: Yes     Partners: Male         Current Outpatient Medications:   •  Adalimumab (Humira) 40 MG/0.4ML Prefilled Syringe Kit injection, 40 mg Every 14 (Fourteen) Days., Disp: , Rfl:   •  cephalexin (KEFLEX) 500 MG capsule, Take 1 capsule by mouth 3 (Three) Times a Day., Disp: 30 capsule, Rfl: 1  •  estradiol (EVAMIST) 1.53 MG/SPRAY transdermal spray, Place 1 spray on the skin as directed by provider Daily., Disp: , Rfl:   •  estradiol (VAGIFEM) 10 MCG tablet vaginal tablet, Insert 1 tablet into the vagina 2 (Two) Times a Week., Disp: , Rfl:   •  eszopiclone (LUNESTA) 2 MG tablet, Take 1 tablet by mouth Every Night. Take immediately before bedtime, Disp: 30 tablet, Rfl: 1  •  frovatriptan (FROVA) 2.5 MG tablet, Take 1 tablet by mouth Daily As Needed for Migraine for up to 1 dose. If recurs, may repeat after 2 hours. Max of 3 tabs in 24 hours., Disp: 30 tablet, Rfl: 5  •  methocarbamol (ROBAXIN) 750 MG tablet, Take 1 tablet by mouth 3 (Three) Times a Day., Disp: 90 tablet, Rfl: 11  •  montelukast (SINGULAIR) 10 MG tablet, Take 1 tablet by mouth Every Night., Disp: 30 tablet, Rfl: 11  •  progesterone (PROMETRIUM) 100 MG capsule, Take 100 mg by mouth Every Evening., Disp: , Rfl:   •  pseudoephedrine (Sudafed) 30 MG tablet, Take 1 tablet by mouth Every 4 (Four) Hours As Needed for Congestion., Disp: 30 tablet, Rfl: 11  •  triamcinolone (KENALOG) 0.1 % cream, triamcinolone acetonide 0.1 % topical cream, Disp: , Rfl:   •   "Nirmatrelvir&Ritonavir 300/100 (PAXLOVID) 20 x 150 MG & 10 x 100MG tablet therapy pack tablet, Take 3 tablets by mouth 2 (Two) Times a Day for 5 days., Disp: 30 tablet, Rfl: 0    Objective   /76 (BP Location: Left arm, Patient Position: Sitting)   Pulse 78   Temp 97.5 °F (36.4 °C)   Ht 162.6 cm (64\")   Wt 69.9 kg (154 lb)   SpO2 99%   BMI 26.43 kg/m²   Physical Exam  Vitals reviewed.   Constitutional:       General: She is not in acute distress.     Appearance: She is well-developed.   HENT:      Head: Normocephalic and atraumatic.      Right Ear: Tympanic membrane, ear canal and external ear normal.      Left Ear: Tympanic membrane, ear canal and external ear normal.      Nose: Nose normal.      Mouth/Throat:      Lips: Pink.      Mouth: Mucous membranes are moist.      Tongue: No lesions.      Pharynx: Oropharynx is clear.   Eyes:      General: Lids are normal. Vision grossly intact. No scleral icterus.     Extraocular Movements: Extraocular movements intact.      Conjunctiva/sclera: Conjunctivae normal.      Pupils: Pupils are equal, round, and reactive to light.   Neck:      Thyroid: No thyroid mass or thyromegaly.      Vascular: No carotid bruit.   Cardiovascular:      Rate and Rhythm: Normal rate and regular rhythm.      Pulses:           Dorsalis pedis pulses are 2+ on the right side and 2+ on the left side.        Posterior tibial pulses are 2+ on the right side and 2+ on the left side.      Heart sounds: Normal heart sounds. No murmur heard.    No friction rub. No gallop. No S3 or S4 sounds.   Pulmonary:      Effort: Pulmonary effort is normal. No respiratory distress.      Breath sounds: Normal breath sounds. No wheezing, rhonchi or rales.   Abdominal:      General: Bowel sounds are normal. There is no distension.      Palpations: Abdomen is soft. There is no mass.      Tenderness: There is no abdominal tenderness.   Musculoskeletal:         General: Normal range of motion.      Cervical back: " Neck supple.      Right lower leg: No edema.      Left lower leg: No edema.   Lymphadenopathy:      Cervical: No cervical adenopathy.   Skin:     General: Skin is warm and dry.      Coloration: Skin is not pale.      Findings: No erythema or rash.      Comments: Erythema on face and neck   Neurological:      Mental Status: She is alert and oriented to person, place, and time.      Cranial Nerves: No cranial nerve deficit.      Sensory: No sensory deficit.      Gait: Gait is intact.   Psychiatric:         Attention and Perception: Attention normal.         Mood and Affect: Mood normal.         Speech: Speech normal.         Behavior: Behavior normal.         Thought Content: Thought content normal.         Judgment: Judgment normal.         Assessment & Plan   Diagnoses and all orders for this visit:    1. Health care maintenance (Primary)    2. Environmental and seasonal allergies  -     pseudoephedrine (Sudafed) 30 MG tablet; Take 1 tablet by mouth Every 4 (Four) Hours As Needed for Congestion.  Dispense: 30 tablet; Refill: 11    3. Allergy, initial encounter  -     pseudoephedrine (Sudafed) 30 MG tablet; Take 1 tablet by mouth Every 4 (Four) Hours As Needed for Congestion.  Dispense: 30 tablet; Refill: 11    4. Recurrent UTI  -     cephalexin (KEFLEX) 500 MG capsule; Take 1 capsule by mouth 3 (Three) Times a Day.  Dispense: 30 capsule; Refill: 1    Other orders  -     Nirmatrelvir&Ritonavir 300/100 (PAXLOVID) 20 x 150 MG & 10 x 100MG tablet therapy pack tablet; Take 3 tablets by mouth 2 (Two) Times a Day for 5 days.  Dispense: 30 tablet; Refill: 0        1. Health Care Maintenance  -pap UTD  -mamm UTD  -cscope UTD  -fasting labs today  -BMI is >= 25 and <30. (Overweight) The following options were offered after discussion;: exercise counseling/recommendations    Reviewed the following with the patient: advised patient of need for:  pap smear, mammogram and immunizations discussed and encouraged patient to exercise  5-7 days per week for 30 minutes at a time.  Counseled regarding: age-appropriate screening labs and tests, wearing seatbelt and sunscreen regularly  Discussed: regular exercise and diet changes to promote healthy weight, checking skin regularly for abnormal moles and lesions

## 2022-12-07 ENCOUNTER — HOSPITAL ENCOUNTER (OUTPATIENT)
Dept: MAMMOGRAPHY | Facility: HOSPITAL | Age: 57
Discharge: HOME | End: 2022-12-07
Admitting: OBSTETRICS & GYNECOLOGY

## 2022-12-07 DIAGNOSIS — Z12.31 VISIT FOR SCREENING MAMMOGRAM: ICD-10-CM

## 2022-12-07 PROCEDURE — 77067 SCR MAMMO BI INCL CAD: CPT | Performed by: RADIOLOGY

## 2022-12-07 PROCEDURE — 77067 SCR MAMMO BI INCL CAD: CPT

## 2022-12-07 PROCEDURE — 77063 BREAST TOMOSYNTHESIS BI: CPT | Performed by: RADIOLOGY

## 2022-12-07 PROCEDURE — 77063 BREAST TOMOSYNTHESIS BI: CPT

## 2023-03-16 DIAGNOSIS — J30.89 ENVIRONMENTAL AND SEASONAL ALLERGIES: ICD-10-CM

## 2023-03-16 DIAGNOSIS — T78.40XA ALLERGY, INITIAL ENCOUNTER: ICD-10-CM

## 2023-03-17 RX ORDER — PSEUDOEPHEDRINE HCL 30 MG/1
TABLET, FILM COATED ORAL
Qty: 24 TABLET | Refills: 6 | Status: SHIPPED | OUTPATIENT
Start: 2023-03-17

## 2023-03-29 ENCOUNTER — PRIOR AUTHORIZATION (OUTPATIENT)
Dept: INTERNAL MEDICINE | Facility: CLINIC | Age: 58
End: 2023-03-29
Payer: COMMERCIAL

## 2023-03-29 ENCOUNTER — OFFICE VISIT (OUTPATIENT)
Dept: INTERNAL MEDICINE | Facility: CLINIC | Age: 58
End: 2023-03-29
Payer: COMMERCIAL

## 2023-03-29 VITALS
HEIGHT: 64 IN | HEART RATE: 80 BPM | DIASTOLIC BLOOD PRESSURE: 68 MMHG | SYSTOLIC BLOOD PRESSURE: 100 MMHG | WEIGHT: 153 LBS | BODY MASS INDEX: 26.12 KG/M2 | OXYGEN SATURATION: 97 %

## 2023-03-29 DIAGNOSIS — R41.3 MEMORY CHANGES: ICD-10-CM

## 2023-03-29 DIAGNOSIS — Z81.8 FAMILY HISTORY OF FIRST DEGREE RELATIVE WITH DEMENTIA: Primary | ICD-10-CM

## 2023-03-29 DIAGNOSIS — G43.809 OTHER MIGRAINE WITHOUT STATUS MIGRAINOSUS, NOT INTRACTABLE: ICD-10-CM

## 2023-03-29 RX ORDER — METHENAMINE, SODIUM PHOSPHATE, MONOBASIC, MONOHYDRATE, PHENYL SALICYLATE, METHYLENE BLUE, AND HYOSCYAMINE SULFATE 120; 40.8; 36; 10; .12 MG/1; MG/1; MG/1; MG/1; MG/1
CAPSULE ORAL EVERY 6 HOURS SCHEDULED
COMMUNITY

## 2023-03-29 RX ORDER — ESTRADIOL MICRONIZED 100 %
POWDER (GRAM) MISCELLANEOUS
COMMUNITY
Start: 2023-01-03

## 2023-03-29 RX ORDER — OXYBUTYNIN CHLORIDE 5 MG/1
TABLET ORAL
COMMUNITY
Start: 2023-03-15

## 2023-03-29 RX ORDER — GLYCOPYRRONIUM 2.4 G/100G
CLOTH TOPICAL AS NEEDED
COMMUNITY

## 2023-03-29 NOTE — PROGRESS NOTES
"Subjective  Answers for HPI/ROS submitted by the patient on 3/29/2023  Please describe your symptoms.: Prescription change and cognition.  Have you had these symptoms before?: Yes  How long have you been having these symptoms?: Greater than 2 weeks  What is the primary reason for your visit?: Sapna Raphael is a 57 y.o. female here for follow-up recent whole genome test.  She brings some results with her today. She is particularly concerned about her risk for alzheimer's. She has noticed some delayed recall and her family has noticed.  She is interested in getting in with a neurologist.  She also would like to change her triptan to Nurtec or Ubrelvy as she is taking some supplements which interact with it.  This supplements are listed below.  They are for cognition.    Mother: vascular dementia, mat gma: alz  Father: dementia, pat gpa: alz    Alpha gpc 99  Centrophenoxine  piracetam 99    I have reviewed the following portions of the patient's history and confirmed they are accurate: current medications, past medical history, past social history and problem list     I have personally reviewed and performed the ROS. Jennifer Abrams MD     Review of Systems:  General: negative  CV: negative  Respiratory: negative  Neuro: Reduced recall  Psych: negative    Objective   /68 (BP Location: Left arm, Patient Position: Sitting)   Pulse 80   Ht 162.6 cm (64\")   Wt 69.4 kg (153 lb)   SpO2 97%   BMI 26.26 kg/m²     Physical Exam  Vitals reviewed.   Constitutional:       Appearance: She is well-developed.   Pulmonary:      Effort: Pulmonary effort is normal.   Skin:     General: Skin is warm and dry.   Neurological:      Mental Status: She is alert and oriented to person, place, and time.   Psychiatric:         Behavior: Behavior normal.         Thought Content: Thought content normal.         Judgment: Judgment normal.         Assessment & Plan   Diagnoses and all orders for this visit:    1. " Family history of first degree relative with dementia (Primary)  -     Cancel: Ambulatory Referral to Neurology  -     Ambulatory Referral to Neurology    2. Other migraine without status migrainosus, not intractable  -     Rimegepant Sulfate (NURTEC) 75 MG tablet dispersible tablet; Take 1 tablet by mouth Daily As Needed (migraine).  Dispense: 30 tablet; Refill: 5  -Stop triptan    3. Memory changes  -     Ambulatory Referral to Neurology             Jennifer Abrams MD

## 2023-06-06 ENCOUNTER — PRIOR AUTHORIZATION (OUTPATIENT)
Dept: INTERNAL MEDICINE | Facility: CLINIC | Age: 58
End: 2023-06-06
Payer: COMMERCIAL

## 2023-08-21 DIAGNOSIS — G47.9 SLEEP DISTURBANCE: ICD-10-CM

## 2023-08-21 RX ORDER — ESZOPICLONE 2 MG/1
2 TABLET, FILM COATED ORAL NIGHTLY
Qty: 30 TABLET | Refills: 1 | Status: SHIPPED | OUTPATIENT
Start: 2023-08-21

## 2023-08-21 NOTE — TELEPHONE ENCOUNTER
----- Message from Britni Raphael sent at 8/20/2023  8:54 AM EDT -----  Regarding: Refill and change of Pharmacy  Contact: [phone]  I am wanting a refill of Eszopiclone 2mg sent to [facility].     Thank you,  Cindy Raphael

## 2023-09-27 ENCOUNTER — TRANSCRIBE ORDERS (OUTPATIENT)
Dept: ADMINISTRATIVE | Facility: HOSPITAL | Age: 58
End: 2023-09-27
Payer: COMMERCIAL

## 2023-09-27 DIAGNOSIS — Z12.31 VISIT FOR SCREENING MAMMOGRAM: Primary | ICD-10-CM

## 2023-10-30 ENCOUNTER — TELEPHONE (OUTPATIENT)
Dept: INTERNAL MEDICINE | Facility: CLINIC | Age: 58
End: 2023-10-30
Payer: COMMERCIAL

## 2023-10-30 NOTE — TELEPHONE ENCOUNTER
"----- Message from Joceline Carolina sent at 10/30/2023  8:14 AM EDT -----  Regarding: FW: Vitamin B12  Contact: [phone]    ----- Message -----  From: Jennifer Abrams MD  Sent: 10/30/2023   7:44 AM EDT  To: [facility] [place]  Subject: FW: Vitamin B12                                        ----- Message -----  From: Italo Siddiqui CMA  Sent: 10/27/2023   7:59 AM EDT  To: Jennifer Abrams MD  Subject: FW: Vitamin B12                                    ----- Message -----  From: Britni Raphael \"Cindy\"  Sent: 10/27/2023   6:42 AM EDT  To: [facility]  Subject: Vitamin B12                                      Dr Lester,    Dr Hardi drew lab work and said my Vitamin B12 was on the low side of normal. I had a B12 shot [facility] a month ago and I did feel better. I was wondering if you would be able to send in a script for injectable B 12. I can do either SubQ or IM injections. My yearly appointment with you is on November 15th but would like to start them before that if you are ok with it.    Thank you,  Cindy Raphael      " No

## 2023-11-15 ENCOUNTER — LAB (OUTPATIENT)
Dept: INTERNAL MEDICINE | Facility: CLINIC | Age: 58
End: 2023-11-15
Payer: COMMERCIAL

## 2023-11-15 ENCOUNTER — OFFICE VISIT (OUTPATIENT)
Dept: INTERNAL MEDICINE | Facility: CLINIC | Age: 58
End: 2023-11-15
Payer: COMMERCIAL

## 2023-11-15 ENCOUNTER — OFFICE VISIT (OUTPATIENT)
Dept: ORTHOPEDIC SURGERY | Facility: CLINIC | Age: 58
End: 2023-11-15
Payer: COMMERCIAL

## 2023-11-15 VITALS
SYSTOLIC BLOOD PRESSURE: 126 MMHG | BODY MASS INDEX: 25.46 KG/M2 | WEIGHT: 152.8 LBS | DIASTOLIC BLOOD PRESSURE: 84 MMHG | HEIGHT: 65 IN

## 2023-11-15 VITALS
BODY MASS INDEX: 25.61 KG/M2 | HEART RATE: 88 BPM | WEIGHT: 150 LBS | SYSTOLIC BLOOD PRESSURE: 110 MMHG | HEIGHT: 64 IN | DIASTOLIC BLOOD PRESSURE: 68 MMHG | OXYGEN SATURATION: 97 %

## 2023-11-15 DIAGNOSIS — G43.809 OTHER MIGRAINE WITHOUT STATUS MIGRAINOSUS, NOT INTRACTABLE: ICD-10-CM

## 2023-11-15 DIAGNOSIS — J30.89 ENVIRONMENTAL AND SEASONAL ALLERGIES: ICD-10-CM

## 2023-11-15 DIAGNOSIS — Z00.00 HEALTH CARE MAINTENANCE: Primary | ICD-10-CM

## 2023-11-15 DIAGNOSIS — Z13.220 LIPID SCREENING: ICD-10-CM

## 2023-11-15 DIAGNOSIS — M16.0 OSTEOARTHRITIS OF BOTH HIPS, UNSPECIFIED OSTEOARTHRITIS TYPE: Primary | ICD-10-CM

## 2023-11-15 DIAGNOSIS — G47.9 SLEEP DISTURBANCE: ICD-10-CM

## 2023-11-15 DIAGNOSIS — T78.40XA ALLERGY, INITIAL ENCOUNTER: ICD-10-CM

## 2023-11-15 LAB
CHOLEST SERPL-MCNC: 202 MG/DL (ref 0–200)
HDLC SERPL-MCNC: 83 MG/DL (ref 40–60)
LDLC SERPL CALC-MCNC: 111 MG/DL (ref 0–100)
LDLC/HDLC SERPL: 1.33 {RATIO}
TRIGL SERPL-MCNC: 42 MG/DL (ref 0–150)
VLDLC SERPL-MCNC: 8 MG/DL (ref 5–40)

## 2023-11-15 PROCEDURE — 36415 COLL VENOUS BLD VENIPUNCTURE: CPT | Performed by: INTERNAL MEDICINE

## 2023-11-15 PROCEDURE — 80061 LIPID PANEL: CPT | Performed by: INTERNAL MEDICINE

## 2023-11-15 PROCEDURE — 99204 OFFICE O/P NEW MOD 45 MIN: CPT | Performed by: ORTHOPAEDIC SURGERY

## 2023-11-15 RX ORDER — ESTRADIOL 10 UG/1
1 INSERT VAGINAL 3 TIMES WEEKLY
Qty: 12 TABLET | Refills: 1 | Status: SHIPPED | OUTPATIENT
Start: 2023-11-15

## 2023-11-15 RX ORDER — ESZOPICLONE 2 MG/1
2 TABLET, FILM COATED ORAL NIGHTLY
Qty: 30 TABLET | Refills: 1 | Status: SHIPPED | OUTPATIENT
Start: 2023-11-15

## 2023-11-15 RX ORDER — MONTELUKAST SODIUM 10 MG/1
10 TABLET ORAL NIGHTLY
Qty: 90 TABLET | Refills: 3 | Status: SHIPPED | OUTPATIENT
Start: 2023-11-15

## 2023-11-15 RX ORDER — PYRIDOSTIGMINE BROMIDE 60 MG/1
30 TABLET ORAL 3 TIMES DAILY
COMMUNITY
Start: 2023-10-25

## 2023-11-15 RX ORDER — PSEUDOEPHEDRINE HCL 30 MG
30 TABLET ORAL EVERY 6 HOURS PRN
Qty: 24 TABLET | Refills: 6 | Status: SHIPPED | OUTPATIENT
Start: 2023-11-15

## 2023-11-15 NOTE — PROGRESS NOTES
Subjective   Britni Raphael is a 58 y.o. female here for yearly physical. Needs some refills. Seeing CRS and pain mgmt. No changes with rheumatology.    Past Medical History:   Diagnosis Date    Arthritis     Back pain     Esophageal reflux     Headache     Hyperthyroidism     Insomnia     Kidney stone 07/2016    Leukopenia     Menopause     Migraine headache     PONV (postoperative nausea and vomiting)     Vision problem      Family History   Problem Relation Age of Onset    Heart attack Maternal Grandfather     Heart disease Maternal Grandfather     Heart attack Other         acute myocardial infarction    Arthritis Other     Cancer Other     Diabetes Other     Hypothyroidism Other     Obesity Other     Osteoarthritis Other     Osteoporosis Other     Breast cancer Neg Hx     Colon cancer Neg Hx     Endometrial cancer Neg Hx     Ovarian cancer Neg Hx      Past Surgical History:   Procedure Laterality Date    AUGMENTATION MAMMAPLASTY Bilateral 2010    COLONOSCOPY      CYSTOSCOPY, URETEROSCOPY, RETROGRADE PYELOGRAM, STONE EXTRACTION, STENT INSERTION Left 02/22/2020    Procedure: CYSTOSCOPY LEFT URETEROSCOPY RETROGRADE PYELOGRAM BASKET STONE EXTRACTION STENT INSERTION LASER STANDBY;  Surgeon: Carlito Davis MD;  Location:  [facility];  Service: Urology;  Laterality: Left;    EXTRACORPOREAL SHOCK WAVE LITHOTRIPSY (ESWL) Right 11/20/2020    Procedure: EXTRACORPOREAL SHOCKWAVE LITHOTRIPSY RIGHT;  Surgeon: Domo Kim Jr., MD;  Location:  [facility];  Service: Urology;  Laterality: Right;  FLURO 3 MIN 40 SEC    FOOT SURGERY      FRACTURE SURGERY      OOPHORECTOMY Right 2014    CYST    OTHER SURGICAL HISTORY      Uterine surgery    TONSILLECTOMY  age 23     Social History     Socioeconomic History    Marital status:    Tobacco Use    Smoking status: Never     Passive exposure: Never    Smokeless tobacco: Never   Vaping Use    Vaping Use: Never used   Substance and Sexual Activity    Alcohol use: Yes  "    Comment: Pt reoprts social drinking    Drug use: No    Sexual activity: Yes     Partners: Male         Current Outpatient Medications:     Adalimumab (Humira) 40 MG/0.4ML Prefilled Syringe Kit injection, 0.4 mL Every 14 (Fourteen) Days., Disp: , Rfl:     Cyanocobalamin 1000 MCG/ML kit, Inject 1 mL as directed Every 30 (Thirty) Days., Disp: 1 kit, Rfl: 5    estradiol (VAGIFEM) 10 MCG tablet vaginal tablet, Insert 1 tablet into the vagina 3 (Three) Times a Week., Disp: , Rfl:     estradiol micronized powder, 3 (Three) Times a Week. CREAM, Disp: , Rfl:     eszopiclone (LUNESTA) 2 MG tablet, Take 1 tablet by mouth Every Night. Take immediately before bedtime, Disp: 30 tablet, Rfl: 1    Glycopyrronium Tosylate (Qbrexza) 2.4 % pads, As Needed., Disp: , Rfl:     methocarbamol (ROBAXIN) 750 MG tablet, Take 1 tablet by mouth 3 (Three) Times a Day. (Patient taking differently: Take 1 tablet by mouth As Needed.), Disp: 90 tablet, Rfl: 11    montelukast (SINGULAIR) 10 MG tablet, Take 1 tablet by mouth Every Night., Disp: 30 tablet, Rfl: 11    progesterone (PROMETRIUM) 100 MG capsule, Take 1 capsule by mouth Every Evening., Disp: , Rfl:     pyridostigmine (MESTINON) 60 MG tablet, 0.5 tablets 3 times a day., Disp: , Rfl:     Rimegepant Sulfate (NURTEC) 75 MG tablet dispersible tablet, Take 1 tablet by mouth Daily As Needed (migraine)., Disp: 30 tablet, Rfl: 5    SudoGest 30 MG tablet, TAKE ONE TABLET BY MOUTH EVERY 4 HOURS AS NEEDED FOR CONGESTION, Disp: 24 tablet, Rfl: 6    triamcinolone (KENALOG) 0.1 % cream, triamcinolone acetonide 0.1 % topical cream, Disp: , Rfl:     Objective   /68 (BP Location: Left arm)   Pulse 88   Ht 162.6 cm (64\")   Wt 68 kg (150 lb)   SpO2 97%   BMI 25.75 kg/m²   Physical Exam  Vitals reviewed.   Constitutional:       General: She is not in acute distress.     Appearance: Normal appearance. She is well-developed.   HENT:      Head: Normocephalic and atraumatic.      Right Ear: Tympanic " membrane, ear canal and external ear normal.      Left Ear: Tympanic membrane, ear canal and external ear normal.      Nose: Nose normal.      Mouth/Throat:      Lips: Pink.      Mouth: Mucous membranes are moist.      Tongue: No lesions.      Pharynx: Oropharynx is clear.   Eyes:      General: Lids are normal. Vision grossly intact. No scleral icterus.     Conjunctiva/sclera: Conjunctivae normal.      Pupils: Pupils are equal, round, and reactive to light.   Neck:      Thyroid: No thyroid mass, thyromegaly or thyroid tenderness.      Vascular: No carotid bruit.   Cardiovascular:      Rate and Rhythm: Normal rate and regular rhythm.      Heart sounds: Normal heart sounds. No murmur heard.     No friction rub. No gallop. No S3 or S4 sounds.   Pulmonary:      Effort: Pulmonary effort is normal.      Breath sounds: Normal breath sounds. No wheezing, rhonchi or rales.   Abdominal:      General: Bowel sounds are normal. There is no distension.      Palpations: Abdomen is soft. There is no mass.      Tenderness: There is no abdominal tenderness.   Musculoskeletal:         General: Normal range of motion.      Cervical back: Neck supple.      Right lower leg: No edema.      Left lower leg: No edema.   Lymphadenopathy:      Cervical: No cervical adenopathy.   Skin:     General: Skin is warm and dry.      Coloration: Skin is not pale.      Findings: No erythema or rash.   Neurological:      Mental Status: She is alert and oriented to person, place, and time. Mental status is at baseline.      Cranial Nerves: No cranial nerve deficit.      Sensory: No sensory deficit.      Gait: Gait is intact.      Comments: CNs grossly intact   Psychiatric:         Attention and Perception: Attention normal.         Mood and Affect: Mood normal.         Speech: Speech normal.         Behavior: Behavior normal.         Thought Content: Thought content normal.         Judgment: Judgment normal.         Assessment & Plan   Diagnoses and all  orders for this visit:    1. Health care maintenance (Primary)    2. Lipid screening  -     Lipid Panel        1. Health Care Maintenance  -pap UTD  -mamm UTD  -colon cancer screening UTD  -fasting labs  -establishing with new gyn Feb 2023  -discussed shingrix, she will get at pharmacy  -BMI is >= 25 and <30. (Overweight) The following options were offered after discussion;: exercise counseling/recommendations and Information on healthy weight added to patient's after visit summary.    Reviewed the following with the patient: advised patient of need for:  mammogram, colonoscopy, and immunizations discussed.  Counseled regarding: age-appropriate screening labs and tests, wearing seatbelt and sunscreen regularly  Discussed: regular exercise and diet changes to promote healthy weight, checking skin regularly for abnormal moles and lesions

## 2023-11-15 NOTE — PROGRESS NOTES
[facility] Note    Subjective     Chief Complaint   Patient presents with    Right Hip - Pain    Left Hip - Pain        HPI    Britni Raphael is a 58 y.o. female who presents with new problem of: bilateral hip pain.  Onset: atraumatic and gradual in nature. The issue has been ongoing for 4 year(s). Pain is a 3/10 on the pain scale. Pain is described as aching, burning, throbbing, and stabbing. Associated symptoms include pain, popping, grinding, and stiffness. The pain is worse with sleeping, working, and leisure; resting, sitting, lying down, and stretching improve the pain. Previous treatments have included: physical therapy.  Pain runs from the posterior aspect into the groin region.  She does have some back issues and SI joint issues also, and has had previous injections with brief relief of those symptoms.  She wanted to have her hips checked out to make sure they were not the main pain generator also.    I have reviewed the following portions of the patient's history and agree with: History of Present Illness and Review of Systems    Patient Active Problem List   Diagnosis    Insomnia    Esophageal reflux    Menopause    Health care maintenance    Sleep disturbance    Headache, migraine    Recurrent urinary tract infection    Arthritis    Non-radiographic axial Spondyloarthropathy     Past Medical History:   Diagnosis Date    Arthritis     Back pain     Esophageal reflux     Headache     Hyperthyroidism     Insomnia     Kidney stone 07/2016    Leukopenia     Menopause     Migraine headache     PONV (postoperative nausea and vomiting)     Vision problem       Past Surgical History:   Procedure Laterality Date    AUGMENTATION MAMMAPLASTY Bilateral 2010    COLONOSCOPY      CYSTOSCOPY, URETEROSCOPY, RETROGRADE PYELOGRAM, STONE EXTRACTION, STENT INSERTION Left 02/22/2020    Procedure: CYSTOSCOPY LEFT URETEROSCOPY RETROGRADE PYELOGRAM BASKET STONE EXTRACTION STENT INSERTION LASER STANDBY;   Surgeon: Carlito Davis MD;  Location:  [facility];  Service: Urology;  Laterality: Left;    EXTRACORPOREAL SHOCK WAVE LITHOTRIPSY (ESWL) Right 11/20/2020    Procedure: EXTRACORPOREAL SHOCKWAVE LITHOTRIPSY RIGHT;  Surgeon: Domo Kim Jr., MD;  Location:  [facility];  Service: Urology;  Laterality: Right;  FLURO 3 MIN 40 SEC    FOOT SURGERY      FRACTURE SURGERY      OOPHORECTOMY Right 2014    CYST    OTHER SURGICAL HISTORY      Uterine surgery    TONSILLECTOMY  age 23      Family History   Problem Relation Age of Onset    Heart attack Maternal Grandfather     Heart disease Maternal Grandfather     Heart attack Other         acute myocardial infarction    Arthritis Other     Cancer Other     Diabetes Other     Hypothyroidism Other     Obesity Other     Osteoarthritis Other     Osteoporosis Other     Breast cancer Neg Hx     Colon cancer Neg Hx     Endometrial cancer Neg Hx     Ovarian cancer Neg Hx      Social History     Socioeconomic History    Marital status:    Tobacco Use    Smoking status: Never     Passive exposure: Never    Smokeless tobacco: Never   Vaping Use    Vaping Use: Never used   Substance and Sexual Activity    Alcohol use: Yes     Comment: Pt reoprts social drinking    Drug use: No    Sexual activity: Yes     Partners: Male      Current Outpatient Medications on File Prior to Visit   Medication Sig Dispense Refill    Adalimumab (Humira) 40 MG/0.4ML Prefilled Syringe Kit injection 0.4 mL Every 14 (Fourteen) Days.      Cyanocobalamin 1000 MCG/ML kit Inject 1 mL as directed Every 30 (Thirty) Days. 1 kit 5    estradiol (VAGIFEM) 10 MCG tablet vaginal tablet Insert 1 tablet into the vagina 3 (Three) Times a Week. 12 tablet 1    estradiol micronized powder 3 (Three) Times a Week. CREAM      eszopiclone (LUNESTA) 2 MG tablet Take 1 tablet by mouth Every Night. Take immediately before bedtime 30 tablet 1    Glycopyrronium Tosylate (Qbrexza) 2.4 % pads As Needed.      methocarbamol  (ROBAXIN) 750 MG tablet Take 1 tablet by mouth 3 (Three) Times a Day. (Patient taking differently: Take 1 tablet by mouth As Needed.) 90 tablet 11    montelukast (SINGULAIR) 10 MG tablet Take 1 tablet by mouth Every Night. 90 tablet 3    progesterone (PROMETRIUM) 100 MG capsule Take 1 capsule by mouth Every Evening.      pseudoephedrine (SudoGest) 30 MG tablet Take 1 tablet by mouth Every 6 (Six) Hours As Needed for Congestion. 24 tablet 6    pyridostigmine (MESTINON) 60 MG tablet 0.5 tablets 3 times a day.      Rimegepant Sulfate (NURTEC) 75 MG tablet dispersible tablet Take 1 tablet by mouth Daily As Needed (migraine). 30 tablet 5    triamcinolone (KENALOG) 0.1 % cream triamcinolone acetonide 0.1 % topical cream      [DISCONTINUED] estradiol (VAGIFEM) 10 MCG tablet vaginal tablet Insert 1 tablet into the vagina 3 (Three) Times a Week.      [DISCONTINUED] eszopiclone (LUNESTA) 2 MG tablet Take 1 tablet by mouth Every Night. Take immediately before bedtime 30 tablet 1    [DISCONTINUED] montelukast (SINGULAIR) 10 MG tablet Take 1 tablet by mouth Every Night. 30 tablet 11    [DISCONTINUED] oxybutynin (DITROPAN) 5 MG tablet every night at bedtime. (Patient not taking: Reported on 11/15/2023)      [DISCONTINUED] Rimegepant Sulfate (NURTEC) 75 MG tablet dispersible tablet Take 1 tablet by mouth Daily As Needed (migraine). 30 tablet 5    [DISCONTINUED] SudoGest 30 MG tablet TAKE ONE TABLET BY MOUTH EVERY 4 HOURS AS NEEDED FOR CONGESTION 24 tablet 6    [DISCONTINUED] uribel (URO-MP) 118 MG capsule capsule Every 6 (Six) Hours. (Patient not taking: Reported on 11/15/2023)       No current facility-administered medications on file prior to visit.      Allergies   Allergen Reactions    Oxycodone-Acetaminophen Itching    Codeine Hives    Hydrocodone-Acetaminophen Itching    Loratadine Other (See Comments)     Makes vision so blurry she can't drive    Lubricating Jelly  [Cream Base] Rash    Nitrofurantoin Hives    Trimethaphan  GI Intolerance        Review of Systems   Constitutional:  Negative for activity change, appetite change, chills, diaphoresis, fatigue, fever and unexpected weight change.   HENT:  Negative for congestion, dental problem, drooling, ear discharge, ear pain, facial swelling, hearing loss, mouth sores, nosebleeds, postnasal drip, rhinorrhea, sinus pressure, sneezing, sore throat, tinnitus, trouble swallowing and voice change.    Eyes:  Negative for photophobia, pain, discharge, redness, itching and visual disturbance.   Respiratory:  Negative for apnea, cough, choking, chest tightness, shortness of breath, wheezing and stridor.    Cardiovascular:  Negative for chest pain, palpitations and leg swelling.   Gastrointestinal:  Negative for abdominal distention, abdominal pain, anal bleeding, blood in stool, constipation, diarrhea, nausea, rectal pain and vomiting.   Endocrine: Negative for cold intolerance, heat intolerance, polydipsia, polyphagia and polyuria.   Genitourinary:  Negative for decreased urine volume, difficulty urinating, dysuria, enuresis, flank pain, frequency, genital sores, hematuria and urgency.   Musculoskeletal:  Positive for arthralgias. Negative for back pain, gait problem, joint swelling, myalgias, neck pain and neck stiffness.   Skin:  Negative for color change, pallor, rash and wound.   Allergic/Immunologic: Negative for environmental allergies, food allergies and immunocompromised state.   Neurological:  Negative for dizziness, tremors, seizures, syncope, facial asymmetry, speech difficulty, weakness, light-headedness, numbness and headaches.   Hematological:  Negative for adenopathy. Does not bruise/bleed easily.   Psychiatric/Behavioral:  Negative for agitation, behavioral problems, confusion, decreased concentration, dysphoric mood, hallucinations, self-injury, sleep disturbance and suicidal ideas. The patient is not nervous/anxious and is not hyperactive.         Objective      Physical  "Exam  /84   Ht 165 cm (64.96\")   Wt 69.3 kg (152 lb 12.8 oz)   BMI 25.46 kg/m²     Body mass index is 25.46 kg/m².    General:   Mental Status:  Alert   Appearance: Cooperative, in no acute distress   Build and Nutrition: Well-nourished well-developed female   Orientation: Alert and oriented to person, place and time   Posture: Normal   Gait: Nonantalgic    Integument:   Right hip: No skin lesions, no rash, no ecchymosis   Left hip: No skin lesions, no rash, no ecchymosis    Neurologic:   Sensation:    Right foot: Intact to light touch on the dorsal and plantar aspect    Left foot: Intact to light touch on the dorsal and plantar aspect   Motor:  Right lower extremity: 5/5 quadriceps, hamstrings, ankle dorsiflexors, and ankle plantar flexors  Left lower extremity: 5/5 quadriceps, hamstrings, ankle dorsiflexors, and ankle plantar flexors  Vascular:   Right lower extremity: 2+ dorsalis pedis pulse, prompt capillary refill   Left lower extremity: 2+ dorsalis pedis pulse, prompt capillary refill    Lower Extremities:   Right Hip:    Tenderness:  Mild lateral tenderness    Swelling:  None    Crepitus: None    Atrophy:  None    Range of motion:  External Rotation: 30°       Internal Rotation: 30°       Flexion:  100°       Extension:  0°   Instability:  None  Deformities:  None  Functional testing: Negative Stinchfield  No leg length discrepancy   Left Hip:    Tenderness:  None    Swelling:  None    Crepitus:  None    Atrophy:  None    Range of motion:  External Rotation: 30°       Internal Rotation: 30°       Flexion:  100°       Extension:  0°   Instability:  None  Deformities:  None  Functional testing: Negative Stinchfield    No leg length discrepancy      Imaging/Studies      Imaging Results (Last 24 Hours)       Procedure Component Value Units Date/Time    XR Hips Bilateral With or Without Pelvis 3-4 View [[id number]] Resulted: 11/15/23 1545     Updated: 11/15/23 1546    Narrative:      Right Hip " Radiographs  Indication: right hip pain  Views: low AP pelvis and lateral of the right hip    Comparison: no prior studies available for review    Findings:   No acute bony abnormalities.  Mild to moderate degenerative changes, with   no acute bony abnormalities.  No unusual bony features.    Left Hip Radiographs  Indication: left hip pain  Views: low AP pelvis and lateral of the left hip    Comparison: no prior studies available for review    Findings:   No acute bony abnormalities.  Mild to moderate degenerative changes, with   no acute bony abnormalities.  No unusual bony features.              Assessment and Plan     Diagnoses and all orders for this visit:    1. Osteoarthritis of both hips, unspecified osteoarthritis type (Primary)  -     XR Hips Bilateral With or Without Pelvis 3-4 View  -     MRI Pelvis Without Contrast; Future        1. Osteoarthritis of both hips, unspecified osteoarthritis type        Reviewed my findings with the patient.  Radiographs show mild to moderate degenerative changes, and at this point I recommended further imaging with MRI.  I will see her back after the MRI has been completed for review, but I will be happy to see her back sooner for any problems.    Return for After Imaging Study.      Tobias Ramirez MD  11/15/23  16:20 EST

## 2023-12-13 ENCOUNTER — HOSPITAL ENCOUNTER (OUTPATIENT)
Dept: MAMMOGRAPHY | Facility: HOSPITAL | Age: 58
Discharge: HOME | End: 2023-12-13
Admitting: OBSTETRICS & GYNECOLOGY
Payer: COMMERCIAL

## 2023-12-13 DIAGNOSIS — Z12.31 VISIT FOR SCREENING MAMMOGRAM: ICD-10-CM

## 2023-12-13 PROCEDURE — 77067 SCR MAMMO BI INCL CAD: CPT

## 2023-12-13 PROCEDURE — 77063 BREAST TOMOSYNTHESIS BI: CPT

## 2023-12-15 DIAGNOSIS — M16.0 OSTEOARTHRITIS OF BOTH HIPS, UNSPECIFIED OSTEOARTHRITIS TYPE: Primary | ICD-10-CM

## 2023-12-22 DIAGNOSIS — M16.0 OSTEOARTHRITIS OF BOTH HIPS, UNSPECIFIED OSTEOARTHRITIS TYPE: Primary | ICD-10-CM

## 2023-12-22 DIAGNOSIS — M25.552 BILATERAL HIP PAIN: ICD-10-CM

## 2023-12-22 DIAGNOSIS — M25.551 BILATERAL HIP PAIN: ICD-10-CM

## 2024-03-18 DIAGNOSIS — H35.9 RETINAL AND VITREOUS DISORDER: Primary | ICD-10-CM

## 2024-03-18 DIAGNOSIS — H43.9 RETINAL AND VITREOUS DISORDER: Primary | ICD-10-CM

## 2024-04-24 RX ORDER — CYANOCOBALAMIN 1000 UG/ML
INJECTION, SOLUTION INTRAMUSCULAR; SUBCUTANEOUS
Qty: 1 ML | Refills: 2 | Status: SHIPPED | OUTPATIENT
Start: 2024-04-24 | End: 2024-04-25 | Stop reason: SDUPTHER

## 2024-04-25 ENCOUNTER — TELEPHONE (OUTPATIENT)
Dept: INTERNAL MEDICINE | Facility: CLINIC | Age: 59
End: 2024-04-25
Payer: COMMERCIAL

## 2024-04-25 RX ORDER — CYANOCOBALAMIN 1000 UG/ML
1000 INJECTION, SOLUTION INTRAMUSCULAR; SUBCUTANEOUS
Qty: 1 ML | Refills: 2 | Status: SHIPPED | OUTPATIENT
Start: 2024-04-25

## 2024-04-25 NOTE — TELEPHONE ENCOUNTER
"  Pharmacy Name:  [facility] [id number] - [address] [place] - [phone] [facility] [phone] FX    Reference Number (if applicable): NATALIE    Pharmacy representative name: ESCOBAR    Pharmacy representative phone number: [phone]     What medication are you calling in regards to:  cyanocobalamin 1000 MCG/ML injection     What question does the pharmacy have: IS THIS MEDICATION AN \"IM\" ?    Who is the provider that prescribed the medication:  Jennifer Abrams MD     Additional notes: NEEDS CLARIFICATION ON MEDICATION    "

## 2024-06-13 ENCOUNTER — OFFICE VISIT (OUTPATIENT)
Dept: INTERNAL MEDICINE | Facility: CLINIC | Age: 59
End: 2024-06-13
Payer: COMMERCIAL

## 2024-06-13 VITALS
OXYGEN SATURATION: 97 % | DIASTOLIC BLOOD PRESSURE: 64 MMHG | HEART RATE: 76 BPM | SYSTOLIC BLOOD PRESSURE: 108 MMHG | BODY MASS INDEX: 25.66 KG/M2 | WEIGHT: 154 LBS | HEIGHT: 65 IN

## 2024-06-13 DIAGNOSIS — R13.10 DYSPHAGIA, UNSPECIFIED TYPE: ICD-10-CM

## 2024-06-13 DIAGNOSIS — G47.9 SLEEP DISTURBANCE: ICD-10-CM

## 2024-06-13 DIAGNOSIS — T17.908A ASPIRATION INTO RESPIRATORY TRACT, INITIAL ENCOUNTER: ICD-10-CM

## 2024-06-13 DIAGNOSIS — K22.2 SCHATZKI'S RING: Primary | ICD-10-CM

## 2024-06-13 PROCEDURE — 99214 OFFICE O/P EST MOD 30 MIN: CPT | Performed by: INTERNAL MEDICINE

## 2024-06-13 RX ORDER — ESTRADIOL 0.1 MG/G
1 CREAM VAGINAL 3 TIMES WEEKLY
COMMUNITY
Start: 2024-06-12 | End: 2024-07-12

## 2024-06-13 RX ORDER — AMOXICILLIN AND CLAVULANATE POTASSIUM 875; 125 MG/1; MG/1
1 TABLET, FILM COATED ORAL 2 TIMES DAILY
Qty: 14 TABLET | Refills: 0 | Status: SHIPPED | OUTPATIENT
Start: 2024-06-13 | End: 2024-06-20

## 2024-06-13 RX ORDER — ESZOPICLONE 2 MG/1
2 TABLET, FILM COATED ORAL NIGHTLY
Qty: 30 TABLET | Refills: 1 | Status: SHIPPED | OUTPATIENT
Start: 2024-06-13

## 2024-06-13 NOTE — PROGRESS NOTES
"Subjective   Britni Raphael is a 59 y.o. female here for she has history of Schatzki's ring requiring dilation in the past.  She says recently she has had some difficulty swallowing solids but not liquids.  She has had no regurgitation or vomiting.  Just feels like something gets stuck if it is particularly dense like meat.  She thinks she needs another EGD.  Also, over the last week she has had some green sputum, no significant wheezing or shortness of breath, but has felt not like herself with reduced energy.  Would like refill on Lunesta for chronic insomnia, works well.    I have reviewed the patient's relevant medical history and confirmed it is accurate.    I have personally reviewed and performed the ROS. Jennifer Abrams MD     Objective   /64 (BP Location: Left arm)   Pulse 76   Ht 165.1 cm (65\")   Wt 69.9 kg (154 lb)   SpO2 97%   BMI 25.63 kg/m²     Physical Exam  Vitals reviewed.   Constitutional:       Appearance: She is well-developed.   Cardiovascular:      Rate and Rhythm: Normal rate and regular rhythm.      Heart sounds: Normal heart sounds.   Pulmonary:      Effort: Pulmonary effort is normal.      Breath sounds: Normal breath sounds. No wheezing or rales.      Comments: Reduced breath sounds in the right lower lung base otherwise CTAB  Skin:     General: Skin is warm and dry.   Neurological:      Mental Status: She is alert and oriented to person, place, and time.   Psychiatric:         Behavior: Behavior normal.         Thought Content: Thought content normal.       Current Outpatient Medications:   •  Adalimumab (Humira) 40 MG/0.4ML Prefilled Syringe Kit injection, 0.4 mL Every 14 (Fourteen) Days., Disp: , Rfl:   •  cyanocobalamin 1000 MCG/ML injection, Inject 1 mL into the appropriate muscle as directed by prescriber Every 30 (Thirty) Days., Disp: 1 mL, Rfl: 2  •  estradiol (ESTRACE) 0.1 MG/GM vaginal cream, 1 g 3 (Three) Times a Week., Disp: , Rfl:   •  eszopiclone " (LUNESTA) 2 MG tablet, Take 1 tablet by mouth Every Night. Take immediately before bedtime, Disp: 30 tablet, Rfl: 1  •  Glycopyrronium Tosylate (Qbrexza) 2.4 % pads, As Needed., Disp: , Rfl:   •  montelukast (SINGULAIR) 10 MG tablet, Take 1 tablet by mouth Every Night., Disp: 90 tablet, Rfl: 3  •  progesterone (PROMETRIUM) 100 MG capsule, Take 1 capsule by mouth Every Evening., Disp: , Rfl:   •  pseudoephedrine (SudoGest) 30 MG tablet, Take 1 tablet by mouth Every 6 (Six) Hours As Needed for Congestion., Disp: 24 tablet, Rfl: 6  •  pyridostigmine (MESTINON) 60 MG tablet, 0.5 tablets 3 times a day., Disp: , Rfl:   •  Rimegepant Sulfate (NURTEC) 75 MG tablet dispersible tablet, Take 1 tablet by mouth Daily As Needed (migraine)., Disp: 30 tablet, Rfl: 5  •  triamcinolone (KENALOG) 0.1 % cream, triamcinolone acetonide 0.1 % topical cream, Disp: , Rfl:     Assessment & Plan   Diagnoses and all orders for this visit:    1. Schatzki's ring (Primary)  -     FL ESOPHAGRAM SINGLE CONTRAST; Future    2. Dysphagia, unspecified type  -     FL ESOPHAGRAM SINGLE CONTRAST; Future    3. Aspiration into respiratory tract, initial encounter  -     amoxicillin-clavulanate (AUGMENTIN) 875-125 MG per tablet; Take 1 tablet by mouth 2 (Two) Times a Day for 7 days.  Dispense: 14 tablet; Refill: 0  -Will cover for aspiration pneumonia.  If not better in few days, will get x-ray.  Discussed with patient getting x-ray now versus waiting, patient would like to wait and see how Augmentin does first    4. Sleep disturbance  -     eszopiclone (LUNESTA) 2 MG tablet; Take 1 tablet by mouth Every Night. Take immediately before bedtime  Dispense: 30 tablet; Refill: 1  -Chronic stable issues with swallowing, coughing up green mucus.             Jennifer Abrams MD

## 2024-06-27 ENCOUNTER — HOSPITAL ENCOUNTER (OUTPATIENT)
Dept: GENERAL RADIOLOGY | Facility: HOSPITAL | Age: 59
Discharge: HOME | End: 2024-06-27
Admitting: INTERNAL MEDICINE
Payer: COMMERCIAL

## 2024-06-27 DIAGNOSIS — R13.10 DYSPHAGIA, UNSPECIFIED TYPE: ICD-10-CM

## 2024-06-27 DIAGNOSIS — K22.2 SCHATZKI'S RING: ICD-10-CM

## 2024-06-27 PROCEDURE — 74220 X-RAY XM ESOPHAGUS 1CNTRST: CPT

## 2024-06-27 RX ADMIN — BARIUM SULFATE 183 ML: 960 POWDER, FOR SUSPENSION ORAL at 08:59

## 2024-06-28 RX ORDER — SUCRALFATE 1 G/1
1 TABLET ORAL 4 TIMES DAILY
Qty: 120 TABLET | Refills: 0 | Status: SHIPPED | OUTPATIENT
Start: 2024-06-28

## 2024-07-15 ENCOUNTER — TELEPHONE (OUTPATIENT)
Dept: INTERNAL MEDICINE | Facility: CLINIC | Age: 59
End: 2024-07-15

## 2024-07-15 NOTE — TELEPHONE ENCOUNTER
Pharmacy Name: [facility] [id number] - [address] [place] - [phone]  - [phone]      Pharmacy representative phone number: [phone]     What medication are you calling in regards to: PAXLOVID    Who is the provider that prescribed the medication: DR KAYE    What question does the pharmacy have: PHARMACY NEEDS CLARIFICATION ON THE QUANTITY FOR THIS MEDICATION. THE DIRECTIONS ARE OKAY, BUT THE QUANTITY OF 53 IS LARGER THAN THE STANDARD BOX OF 30.    PLEASE ADVISE

## 2024-11-12 ENCOUNTER — TRANSCRIBE ORDERS (OUTPATIENT)
Dept: ADMINISTRATIVE | Facility: HOSPITAL | Age: 59
End: 2024-11-12
Payer: COMMERCIAL

## 2024-11-12 DIAGNOSIS — Z12.31 SCREENING MAMMOGRAM FOR BREAST CANCER: Primary | ICD-10-CM

## 2024-11-21 ENCOUNTER — OFFICE VISIT (OUTPATIENT)
Dept: INTERNAL MEDICINE | Facility: CLINIC | Age: 59
End: 2024-11-21
Payer: COMMERCIAL

## 2024-11-21 ENCOUNTER — LAB (OUTPATIENT)
Dept: INTERNAL MEDICINE | Facility: CLINIC | Age: 59
End: 2024-11-21
Payer: COMMERCIAL

## 2024-11-21 VITALS
DIASTOLIC BLOOD PRESSURE: 68 MMHG | SYSTOLIC BLOOD PRESSURE: 108 MMHG | OXYGEN SATURATION: 97 % | BODY MASS INDEX: 25.83 KG/M2 | HEART RATE: 94 BPM | WEIGHT: 155 LBS | HEIGHT: 65 IN

## 2024-11-21 DIAGNOSIS — G43.809 OTHER MIGRAINE WITHOUT STATUS MIGRAINOSUS, NOT INTRACTABLE: ICD-10-CM

## 2024-11-21 DIAGNOSIS — J30.89 ENVIRONMENTAL AND SEASONAL ALLERGIES: ICD-10-CM

## 2024-11-21 DIAGNOSIS — Z11.59 NEED FOR HEPATITIS B SCREENING TEST: ICD-10-CM

## 2024-11-21 DIAGNOSIS — Z78.0 POST-MENOPAUSAL: ICD-10-CM

## 2024-11-21 DIAGNOSIS — Z00.00 HEALTH CARE MAINTENANCE: Primary | ICD-10-CM

## 2024-11-21 DIAGNOSIS — Z86.39 HISTORY OF HYPOTHYROIDISM: ICD-10-CM

## 2024-11-21 DIAGNOSIS — Z13.220 LIPID SCREENING: ICD-10-CM

## 2024-11-21 DIAGNOSIS — T78.40XA ALLERGY, INITIAL ENCOUNTER: ICD-10-CM

## 2024-11-21 DIAGNOSIS — Z23 NEED FOR INFLUENZA VACCINATION: ICD-10-CM

## 2024-11-21 DIAGNOSIS — K52.832 LYMPHOCYTIC COLITIS: ICD-10-CM

## 2024-11-21 DIAGNOSIS — G47.9 SLEEP DISTURBANCE: ICD-10-CM

## 2024-11-21 LAB
CHOLEST SERPL-MCNC: 222 MG/DL (ref 0–200)
HDLC SERPL-MCNC: 83 MG/DL (ref 40–60)
LDLC SERPL CALC-MCNC: 133 MG/DL (ref 0–100)
LDLC/HDLC SERPL: 1.59 {RATIO}
TRIGL SERPL-MCNC: 36 MG/DL (ref 0–150)
VLDLC SERPL-MCNC: 6 MG/DL (ref 5–40)

## 2024-11-21 PROCEDURE — 87340 HEPATITIS B SURFACE AG IA: CPT | Performed by: INTERNAL MEDICINE

## 2024-11-21 PROCEDURE — 86800 THYROGLOBULIN ANTIBODY: CPT | Performed by: INTERNAL MEDICINE

## 2024-11-21 PROCEDURE — 86706 HEP B SURFACE ANTIBODY: CPT | Performed by: INTERNAL MEDICINE

## 2024-11-21 PROCEDURE — 86376 MICROSOMAL ANTIBODY EACH: CPT | Performed by: INTERNAL MEDICINE

## 2024-11-21 PROCEDURE — 80061 LIPID PANEL: CPT | Performed by: INTERNAL MEDICINE

## 2024-11-21 PROCEDURE — 36415 COLL VENOUS BLD VENIPUNCTURE: CPT | Performed by: INTERNAL MEDICINE

## 2024-11-21 RX ORDER — PSEUDOEPHEDRINE HCL 30 MG/1
30 TABLET, FILM COATED ORAL EVERY 6 HOURS PRN
Qty: 24 TABLET | Refills: 6 | Status: SHIPPED | OUTPATIENT
Start: 2024-11-21

## 2024-11-21 RX ORDER — PYRIDOSTIGMINE BROMIDE 60 MG/1
30 TABLET ORAL 3 TIMES DAILY
Status: CANCELLED | OUTPATIENT
Start: 2024-11-21

## 2024-11-21 RX ORDER — PYRIDOSTIGMINE BROMIDE 60 MG/1
30 TABLET ORAL 4 TIMES DAILY
Qty: 60 TABLET | Refills: 11 | Status: SHIPPED | OUTPATIENT
Start: 2024-11-21

## 2024-11-21 RX ORDER — MONTELUKAST SODIUM 10 MG/1
10 TABLET ORAL NIGHTLY
Qty: 90 TABLET | Refills: 3 | Status: SHIPPED | OUTPATIENT
Start: 2024-11-21

## 2024-11-21 RX ORDER — ESZOPICLONE 2 MG/1
2 TABLET, FILM COATED ORAL NIGHTLY
Qty: 30 TABLET | Refills: 2 | Status: SHIPPED | OUTPATIENT
Start: 2024-11-21

## 2024-11-21 RX ORDER — ESTRADIOL 1.53 MG/1
1 SPRAY TRANSDERMAL DAILY
COMMUNITY
Start: 2024-08-15 | End: 2025-08-15

## 2024-11-21 RX ORDER — PROGESTERONE 100 MG/1
100 CAPSULE ORAL DAILY
COMMUNITY
Start: 2024-08-15 | End: 2025-08-15

## 2024-11-21 NOTE — PROGRESS NOTES
Subjective   Britni Raphael is a 59 y.o. female here for yearly physical.    Past Medical History:   Diagnosis Date    Allergic     Arthritis     Back pain     Colon polyp     9years ago - removed with colonoscopy    Esophageal reflux     Headache     Hyperthyroidism     Insomnia     Kidney stone 2016    Leukopenia     Menopause     Migraine headache     PONV (postoperative nausea and vomiting)     Urinary tract infection     Frequent    Vision problem      Family History   Problem Relation Age of Onset    Breast cancer Maternal Aunt 79    Heart attack Maternal Grandfather     Heart disease Maternal Grandfather             Heart attack Other         acute myocardial infarction    Arthritis Other     Cancer Other     Diabetes Other     Hypothyroidism Other     Obesity Other     Osteoarthritis Other     Osteoporosis Other     Arthritis Mother     Thyroid disease Mother     Cancer Father     COPD Father     Hyperlipidemia Father     Thyroid disease Father         Hypothyroidism    Birth defects Daughter     Cancer Brother             Thyroid disease Brother         Goiter removed    Thyroid disease Daughter         Hashimotos    Colon cancer Neg Hx     Endometrial cancer Neg Hx     Ovarian cancer Neg Hx      Past Surgical History:   Procedure Laterality Date    AUGMENTATION MAMMAPLASTY Bilateral 2010    COLONOSCOPY      COSMETIC SURGERY  2010    Breast implants    CYSTOSCOPY, URETEROSCOPY, RETROGRADE PYELOGRAM, STONE EXTRACTION, STENT INSERTION Left 2020    Procedure: CYSTOSCOPY LEFT URETEROSCOPY RETROGRADE PYELOGRAM BASKET STONE EXTRACTION STENT INSERTION LASER STANDBY;  Surgeon: Carlito Davis MD;  Location:  [facility];  Service: Urology;  Laterality: Left;    ENDOMETRIAL ABLATION      EXTRACORPOREAL SHOCK WAVE LITHOTRIPSY (ESWL) Right 2020    Procedure: EXTRACORPOREAL SHOCKWAVE LITHOTRIPSY RIGHT;  Surgeon: Domo Kim Jr., MD;  Location:  [facility];  Service:  Urology;  Laterality: Right;  FLURO 3 MIN 40 SEC    EYE SURGERY  2000    Lasix    FOOT SURGERY      FRACTURE SURGERY      OOPHORECTOMY Right 2014    CYST    OTHER SURGICAL HISTORY      Uterine surgery    RETINAL LASER PROCEDURE Left     SUBTOTAL HYSTERECTOMY      Right ovary removed    TONSILLECTOMY  age 23     Social History     Socioeconomic History    Marital status:    Tobacco Use    Smoking status: Never     Passive exposure: Never    Smokeless tobacco: Never   Vaping Use    Vaping status: Never Used   Substance and Sexual Activity    Alcohol use: Yes     Comment: infrequent    Drug use: No    Sexual activity: Yes     Partners: Male     Birth control/protection: Vasectomy     Comment: Age         Current Outpatient Medications:     Adalimumab (Humira) 40 MG/0.4ML Prefilled Syringe Kit injection, 0.4 mL Every 14 (Fourteen) Days., Disp: , Rfl:     cyanocobalamin 1000 MCG/ML injection, Inject 1 mL into the appropriate muscle as directed by prescriber Every 30 (Thirty) Days., Disp: 1 mL, Rfl: 2    estradiol (Evamist) 1.53 MG/SPRAY transdermal spray, Place 1 spray on the skin as directed by provider Daily., Disp: , Rfl:     montelukast (SINGULAIR) 10 MG tablet, Take 1 tablet by mouth Every Night., Disp: 90 tablet, Rfl: 3    Progesterone (PROMETRIUM) 100 MG capsule, Take 1 capsule by mouth Daily., Disp: , Rfl:     eszopiclone (LUNESTA) 2 MG tablet, Take 1 tablet by mouth Every Night. Take immediately before bedtime, Disp: 30 tablet, Rfl: 1    pseudoephedrine (SudoGest) 30 MG tablet, Take 1 tablet by mouth Every 6 (Six) Hours As Needed for Congestion., Disp: 24 tablet, Rfl: 6    pyridostigmine (MESTINON) 60 MG tablet, 0.5 tablets 3 times a day., Disp: , Rfl:     Rimegepant Sulfate (NURTEC) 75 MG tablet dispersible tablet, Take 1 tablet by mouth Daily As Needed (migraine)., Disp: 30 tablet, Rfl: 5    sucralfate (Carafate) 1 g tablet, Take 1 tablet by mouth 4 (Four) Times a Day. (Patient taking differently: Take  "1 tablet by mouth As Needed.), Disp: 120 tablet, Rfl: 0    Objective   /68 (BP Location: Left arm)   Pulse 94   Ht 165.1 cm (65\")   Wt 70.3 kg (155 lb)   SpO2 97%   BMI 25.79 kg/m²   Physical Exam  Vitals reviewed.   Constitutional:       General: She is not in acute distress.     Appearance: Normal appearance. She is well-developed.   HENT:      Head: Normocephalic and atraumatic.      Right Ear: Tympanic membrane, ear canal and external ear normal.      Left Ear: Tympanic membrane, ear canal and external ear normal.      Nose: Nose normal.      Mouth/Throat:      Lips: Pink.      Mouth: Mucous membranes are moist.      Tongue: No lesions.      Pharynx: Oropharynx is clear.   Eyes:      General: Lids are normal. Vision grossly intact. No scleral icterus.     Conjunctiva/sclera: Conjunctivae normal.      Pupils: Pupils are equal, round, and reactive to light.   Neck:      Thyroid: No thyroid mass, thyromegaly or thyroid tenderness.      Vascular: No carotid bruit.   Cardiovascular:      Rate and Rhythm: Normal rate and regular rhythm.      Heart sounds: Normal heart sounds. No murmur heard.     No friction rub. No gallop. No S3 or S4 sounds.   Pulmonary:      Effort: Pulmonary effort is normal.      Breath sounds: Normal breath sounds. No wheezing, rhonchi or rales.   Abdominal:      General: Bowel sounds are normal. There is no distension.      Palpations: Abdomen is soft. There is no mass.      Tenderness: There is no abdominal tenderness.   Musculoskeletal:         General: Normal range of motion.      Cervical back: Neck supple.      Right lower leg: No edema.      Left lower leg: No edema.   Lymphadenopathy:      Cervical: No cervical adenopathy.   Skin:     General: Skin is warm and dry.      Coloration: Skin is not pale.      Findings: No erythema or rash.   Neurological:      Mental Status: She is alert and oriented to person, place, and time. Mental status is at baseline.      Cranial Nerves: No " cranial nerve deficit.      Sensory: No sensory deficit.      Gait: Gait is intact.      Comments: CNs grossly intact. Balance grossly intact.   Psychiatric:         Attention and Perception: Attention normal.         Mood and Affect: Mood normal.         Speech: Speech normal.         Behavior: Behavior normal.         Thought Content: Thought content normal.         Judgment: Judgment normal.         Assessment & Plan   Diagnoses and all orders for this visit:    1. Health care maintenance (Primary)    2. Need for influenza vaccination  -     Fluzone >6mos ([id number])    3. Sleep disturbance  -     eszopiclone (LUNESTA) 2 MG tablet; Take 1 tablet by mouth Every Night. Take immediately before bedtime  Dispense: 30 tablet; Refill: 2    4. Allergy, initial encounter  -     montelukast (SINGULAIR) 10 MG tablet; Take 1 tablet by mouth Every Night.  Dispense: 90 tablet; Refill: 3  -     pseudoephedrine (SudoGest) 30 MG tablet; Take 1 tablet by mouth Every 6 (Six) Hours As Needed for Congestion.  Dispense: 24 tablet; Refill: 6    5. Environmental and seasonal allergies  -     pseudoephedrine (SudoGest) 30 MG tablet; Take 1 tablet by mouth Every 6 (Six) Hours As Needed for Congestion.  Dispense: 24 tablet; Refill: 6    6. Other migraine without status migrainosus, not intractable  -     Rimegepant Sulfate (NURTEC) 75 MG tablet dispersible tablet; Take 1 tablet by mouth Daily As Needed (migraine).  Dispense: 30 tablet; Refill: 5    7. Lymphocytic colitis  -     pyridostigmine (MESTINON) 60 MG tablet; Take 0.5 tablets by mouth 4 (Four) Times a Day.  Dispense: 60 tablet; Refill: 11    8. History of hypothyroidism  -     Thyroid Antibodies    9. Lipid screening  -     Lipid Panel    10. Post-menopausal  -     DEXA Bone Density Axial; Future          1. Health Care Maintenance  -pap UTD  -mamm UTD  -dxa ordered  -colon cancer screening UTD  -fasting labs  -Flu vaccine today.  Discussed other eligible vaccines.  Will defer  COVID booster, will eventually get Shingrix at pharmacy.   -BMI is >= 25 and <30. (Overweight) The following options were offered after discussion;: Information on healthy weight added to patient's after visit summary.    Reviewed the following with the patient: advised patient of need for:  pap smear, mammogram, bone density, colonoscopy, and immunizations discussed.  Counseled regarding: age-appropriate screening labs and tests, wearing seatbelt and sunscreen regularly  Discussed: regular exercise and diet changes to promote healthy weight, checking skin regularly for abnormal moles and lesions

## 2024-11-22 LAB
HBV SURFACE AB SER RIA-ACNC: REACTIVE
HBV SURFACE AG SERPL QL IA: NORMAL

## 2024-11-25 DIAGNOSIS — Z82.49 FAMILY HISTORY OF PREMATURE CAD: ICD-10-CM

## 2024-11-25 DIAGNOSIS — E78.5 HYPERLIPIDEMIA, UNSPECIFIED HYPERLIPIDEMIA TYPE: Primary | ICD-10-CM

## 2024-11-25 LAB
THYROGLOB AB SERPL-ACNC: <1 IU/ML (ref 0–0.9)
THYROPEROXIDASE AB SERPL-ACNC: 15 IU/ML (ref 0–34)

## 2024-11-26 DIAGNOSIS — T78.40XA ALLERGY, INITIAL ENCOUNTER: ICD-10-CM

## 2024-11-26 DIAGNOSIS — J30.89 ENVIRONMENTAL AND SEASONAL ALLERGIES: ICD-10-CM

## 2024-11-26 DIAGNOSIS — G47.9 SLEEP DISTURBANCE: ICD-10-CM

## 2024-11-27 RX ORDER — PSEUDOEPHEDRINE HCL 30 MG/1
TABLET, FILM COATED ORAL
Qty: 24 TABLET | Refills: 6 | Status: SHIPPED | OUTPATIENT
Start: 2024-11-27

## 2024-11-27 RX ORDER — ESZOPICLONE 2 MG/1
2 TABLET, FILM COATED ORAL
Qty: 30 TABLET | Refills: 2 | Status: SHIPPED | OUTPATIENT
Start: 2024-11-27

## 2024-11-27 RX ORDER — MONTELUKAST SODIUM 10 MG/1
10 TABLET ORAL NIGHTLY
Qty: 90 TABLET | Refills: 3 | Status: SHIPPED | OUTPATIENT
Start: 2024-11-27

## 2024-12-15 DIAGNOSIS — G47.9 SLEEP DISTURBANCE: ICD-10-CM

## 2024-12-15 DIAGNOSIS — G43.809 OTHER MIGRAINE WITHOUT STATUS MIGRAINOSUS, NOT INTRACTABLE: ICD-10-CM

## 2024-12-16 RX ORDER — RIMEGEPANT SULFATE 75 MG/75MG
TABLET, ORALLY DISINTEGRATING ORAL
Qty: 16 TABLET | Refills: 11 | Status: SHIPPED | OUTPATIENT
Start: 2024-12-16

## 2024-12-16 RX ORDER — ESZOPICLONE 2 MG/1
2 TABLET, FILM COATED ORAL
Qty: 30 TABLET | Refills: 2 | Status: SHIPPED | OUTPATIENT
Start: 2024-12-16 | End: 2024-12-23

## 2024-12-19 ENCOUNTER — HOSPITAL ENCOUNTER (OUTPATIENT)
Facility: HOSPITAL | Age: 59
Discharge: HOME | End: 2024-12-19
Admitting: OBSTETRICS & GYNECOLOGY
Payer: COMMERCIAL

## 2024-12-19 DIAGNOSIS — Z12.31 SCREENING MAMMOGRAM FOR BREAST CANCER: ICD-10-CM

## 2024-12-19 LAB
NCCN CRITERIA FLAG: ABNORMAL
TYRER CUZICK SCORE: 7.9

## 2024-12-19 PROCEDURE — 77063 BREAST TOMOSYNTHESIS BI: CPT

## 2024-12-19 PROCEDURE — 77067 SCR MAMMO BI INCL CAD: CPT

## 2024-12-22 DIAGNOSIS — G47.9 SLEEP DISTURBANCE: ICD-10-CM

## 2024-12-23 DIAGNOSIS — G47.9 SLEEP DISTURBANCE: ICD-10-CM

## 2024-12-23 RX ORDER — ESZOPICLONE 2 MG/1
2 TABLET, FILM COATED ORAL
Qty: 30 TABLET | Refills: 2 | Status: SHIPPED | OUTPATIENT
Start: 2024-12-23 | End: 2024-12-23

## 2024-12-23 RX ORDER — ESZOPICLONE 2 MG/1
2 TABLET, FILM COATED ORAL NIGHTLY
Qty: 30 TABLET | Refills: 2 | Status: SHIPPED | OUTPATIENT
Start: 2024-12-23

## 2024-12-23 RX ORDER — ESZOPICLONE 2 MG/1
2 TABLET, FILM COATED ORAL
Qty: 30 TABLET | Refills: 2 | Status: SHIPPED | OUTPATIENT
Start: 2024-12-23 | End: 2024-12-23 | Stop reason: SDUPTHER

## 2025-01-03 ENCOUNTER — OFFICE VISIT (OUTPATIENT)
Dept: CARDIOLOGY | Facility: CLINIC | Age: 60
End: 2025-01-03
Payer: COMMERCIAL

## 2025-01-03 VITALS
BODY MASS INDEX: 25.16 KG/M2 | OXYGEN SATURATION: 97 % | HEART RATE: 88 BPM | WEIGHT: 151 LBS | SYSTOLIC BLOOD PRESSURE: 98 MMHG | DIASTOLIC BLOOD PRESSURE: 62 MMHG | HEIGHT: 65 IN

## 2025-01-03 DIAGNOSIS — E78.5 HYPERLIPIDEMIA, UNSPECIFIED HYPERLIPIDEMIA TYPE: Primary | ICD-10-CM

## 2025-01-03 PROCEDURE — 93000 ELECTROCARDIOGRAM COMPLETE: CPT | Performed by: INTERNAL MEDICINE

## 2025-01-03 PROCEDURE — 99204 OFFICE O/P NEW MOD 45 MIN: CPT | Performed by: INTERNAL MEDICINE

## 2025-01-03 RX ORDER — METOPROLOL TARTRATE 100 MG/1
TABLET ORAL
Qty: 2 TABLET | Refills: 0 | Status: SHIPPED | OUTPATIENT
Start: 2025-01-03

## 2025-01-03 RX ORDER — TRIAMCINOLONE ACETONIDE 1 MG/G
CREAM TOPICAL AS NEEDED
COMMUNITY

## 2025-01-03 NOTE — PROGRESS NOTES
"Chief Complaint  Establish Care (New Patient)      Subjective   History of Present Illness    Problem List  -HLD  -EKG normal    Mrs. Raphael is a 59 year old who is being seen for the above.  Long hx of HLD and strong family hx of CV disease.  Was having issues with inappropriate sinus tachycardia over 15 years.  Eventually resolved.  Also had some remote atypical chest pain with reassuring stress testing.  Currently no CV complaints but concerned about her HLD.  ROS negative except for the above.         Objective   Vital Signs:  Vitals:    01/03/25 0956   BP: 98/62   Pulse: 88   SpO2: 97%     Estimated body mass index is 25.13 kg/m² as calculated from the following:    Height as of this encounter: 165.1 cm (65\").    Weight as of this encounter: 68.5 kg (151 lb).       Physical Exam  HENT:      Head: Normocephalic.   Eyes:      Extraocular Movements: Extraocular movements intact.   Cardiovascular:      Rate and Rhythm: Normal rate and regular rhythm.      Heart sounds: No murmur heard.     No gallop.   Pulmonary:      Breath sounds: Normal breath sounds.   Abdominal:      Palpations: Abdomen is soft.   Musculoskeletal:      Right lower leg: No edema.      Left lower leg: No edema.   Skin:     General: Skin is warm and dry.   Neurological:      General: No focal deficit present.      Mental Status: She is alert.   Psychiatric:         Mood and Affect: Mood normal.           ECG 12 Lead    Date/Time: 1/3/2025 11:34 AM  Performed by: Flo Lima MD    Authorized by: Flo Lima MD  Rhythm: sinus rhythm    Clinical impression: normal ECG           Assessment   -HLD  -EKG normal    Plan   -CT calcium score and BB if necessary to assess risk in asymptomatic patient.    Return in about 3 months (around 4/3/2025).  Flo Lima MD  01/03/2025 11:34 EST     "

## 2025-01-10 DIAGNOSIS — Z13.79 GENETIC TESTING: Primary | ICD-10-CM

## 2025-01-20 ENCOUNTER — TELEPHONE (OUTPATIENT)
Dept: CARDIOLOGY | Facility: CLINIC | Age: 60
End: 2025-01-20
Payer: COMMERCIAL

## 2025-01-20 NOTE — TELEPHONE ENCOUNTER
Called pt to address MyChart question. LVM stating they can discuss her high cholesterol levels at her f/u appt. Asked to call with any questions of concerns.

## 2025-01-31 ENCOUNTER — HOSPITAL ENCOUNTER (OUTPATIENT)
Dept: BONE DENSITY | Facility: HOSPITAL | Age: 60
Discharge: HOME | End: 2025-01-31
Admitting: INTERNAL MEDICINE
Payer: COMMERCIAL

## 2025-01-31 DIAGNOSIS — Z78.0 POST-MENOPAUSAL: ICD-10-CM

## 2025-01-31 PROCEDURE — 77080 DXA BONE DENSITY AXIAL: CPT

## 2025-02-11 RX ORDER — PYRIDOSTIGMINE BROMIDE 60 MG/1
30 TABLET ORAL 4 TIMES DAILY
Qty: 60 TABLET | Refills: 11 | Status: SHIPPED | OUTPATIENT
Start: 2025-02-11

## 2025-03-20 ENCOUNTER — HOSPITAL ENCOUNTER (OUTPATIENT)
Dept: GENERAL RADIOLOGY | Facility: HOSPITAL | Age: 60
Discharge: HOME | End: 2025-03-20
Admitting: INTERNAL MEDICINE
Payer: COMMERCIAL

## 2025-03-20 ENCOUNTER — LAB (OUTPATIENT)
Dept: INTERNAL MEDICINE | Facility: CLINIC | Age: 60
End: 2025-03-20
Payer: COMMERCIAL

## 2025-03-20 ENCOUNTER — OFFICE VISIT (OUTPATIENT)
Dept: INTERNAL MEDICINE | Facility: CLINIC | Age: 60
End: 2025-03-20
Payer: COMMERCIAL

## 2025-03-20 VITALS
SYSTOLIC BLOOD PRESSURE: 118 MMHG | DIASTOLIC BLOOD PRESSURE: 64 MMHG | WEIGHT: 157 LBS | HEART RATE: 58 BPM | OXYGEN SATURATION: 95 % | BODY MASS INDEX: 26.16 KG/M2 | HEIGHT: 65 IN

## 2025-03-20 DIAGNOSIS — R53.83 OTHER FATIGUE: ICD-10-CM

## 2025-03-20 DIAGNOSIS — R06.09 DOE (DYSPNEA ON EXERTION): Primary | ICD-10-CM

## 2025-03-20 LAB
BASOPHILS # BLD AUTO: 0.06 10*3/MM3 (ref 0–0.2)
BASOPHILS NFR BLD AUTO: 0.8 % (ref 0–1.5)
DEPRECATED RDW RBC AUTO: 41 FL (ref 37–54)
EOSINOPHIL # BLD AUTO: 0.14 10*3/MM3 (ref 0–0.4)
EOSINOPHIL NFR BLD AUTO: 1.8 % (ref 0.3–6.2)
ERYTHROCYTE [DISTWIDTH] IN BLOOD BY AUTOMATED COUNT: 11.8 % (ref 12.3–15.4)
ERYTHROCYTE [SEDIMENTATION RATE] IN BLOOD: 10 MM/HR (ref 0–30)
HCT VFR BLD AUTO: 40.9 % (ref 34–46.6)
HGB BLD-MCNC: 13.7 G/DL (ref 12–15.9)
IMM GRANULOCYTES # BLD AUTO: 0.02 10*3/MM3 (ref 0–0.05)
IMM GRANULOCYTES NFR BLD AUTO: 0.3 % (ref 0–0.5)
LYMPHOCYTES # BLD AUTO: 2.53 10*3/MM3 (ref 0.7–3.1)
LYMPHOCYTES NFR BLD AUTO: 33 % (ref 19.6–45.3)
MCH RBC QN AUTO: 31.6 PG (ref 26.6–33)
MCHC RBC AUTO-ENTMCNC: 33.5 G/DL (ref 31.5–35.7)
MCV RBC AUTO: 94.5 FL (ref 79–97)
MONOCYTES # BLD AUTO: 0.51 10*3/MM3 (ref 0.1–0.9)
MONOCYTES NFR BLD AUTO: 6.7 % (ref 5–12)
NEUTROPHILS NFR BLD AUTO: 4.4 10*3/MM3 (ref 1.7–7)
NEUTROPHILS NFR BLD AUTO: 57.4 % (ref 42.7–76)
NRBC BLD AUTO-RTO: 0 /100 WBC (ref 0–0.2)
PLATELET # BLD AUTO: 289 10*3/MM3 (ref 140–450)
PMV BLD AUTO: 10.9 FL (ref 6–12)
RBC # BLD AUTO: 4.33 10*6/MM3 (ref 3.77–5.28)
WBC NRBC COR # BLD AUTO: 7.66 10*3/MM3 (ref 3.4–10.8)

## 2025-03-20 PROCEDURE — 71046 X-RAY EXAM CHEST 2 VIEWS: CPT

## 2025-03-20 PROCEDURE — 82550 ASSAY OF CK (CPK): CPT | Performed by: INTERNAL MEDICINE

## 2025-03-20 PROCEDURE — 99214 OFFICE O/P EST MOD 30 MIN: CPT | Performed by: INTERNAL MEDICINE

## 2025-03-20 PROCEDURE — 80053 COMPREHEN METABOLIC PANEL: CPT | Performed by: INTERNAL MEDICINE

## 2025-03-20 PROCEDURE — 85025 COMPLETE CBC W/AUTO DIFF WBC: CPT | Performed by: INTERNAL MEDICINE

## 2025-03-20 PROCEDURE — 85652 RBC SED RATE AUTOMATED: CPT | Performed by: INTERNAL MEDICINE

## 2025-03-20 PROCEDURE — 86140 C-REACTIVE PROTEIN: CPT | Performed by: INTERNAL MEDICINE

## 2025-03-20 RX ORDER — ESTRADIOL 0.1 MG/G
2 CREAM VAGINAL 3 TIMES WEEKLY
COMMUNITY
Start: 2025-02-07 | End: 2026-02-07

## 2025-03-20 NOTE — PROGRESS NOTES
"Subjective   Britni Raphael is a 59 y.o. female here for worsening dyspnea on exertion, fatigue.  She has noticed that this has developed over the last 2 to 3 months.  No new medication changes.  She is on Humira for spondyloarthropathy.  She does see rheumatology.  She had felt very weak lately and was not able to exercise as she once did.  Her  has noticed.  She had considered to that perhaps she had a neuromuscular disorder as she felt weak all over.  However, she has felt more out of breath lately.  She exercised recently on the elliptical and did 20 minutes at her usual pace, but felt very tired after.  She took her oxygen level and it was at 89% which is very unusual for her.  She is a lifetime non-smoker.  She denies wheezing, no cough.  She did recently discover mold in one of her humidifiers.    ap 3 morning or fri afternoon    I have reviewed the patient's relevant medical history and confirmed it is accurate.    I have personally reviewed and performed the ROS. Jennifer Abrams MD     Objective   /64 (BP Location: Left arm)   Pulse 58   Ht 165.1 cm (65\")   Wt 71.2 kg (157 lb)   SpO2 95%   BMI 26.13 kg/m²     Physical Exam  Vitals reviewed.   Constitutional:       Appearance: She is well-developed.   Cardiovascular:      Rate and Rhythm: Normal rate and regular rhythm.      Heart sounds: Normal heart sounds.   Pulmonary:      Effort: Pulmonary effort is normal.      Breath sounds: Normal breath sounds. No wheezing or rales.   Skin:     General: Skin is warm and dry.   Neurological:      General: No focal deficit present.      Mental Status: She is alert and oriented to person, place, and time.   Psychiatric:         Behavior: Behavior normal.         Thought Content: Thought content normal.         Judgment: Judgment normal.           Current Outpatient Medications:     Adalimumab (Humira) 40 MG/0.4ML Prefilled Syringe Kit injection, 0.4 mL Every 14 (Fourteen) Days., Disp: " , Rfl:     ALLERGY SERUM INJECTION, Inject  under the skin into the appropriate area as directed Every 14 (Fourteen) Days., Disp: , Rfl:     cyanocobalamin 1000 MCG/ML injection, Inject 1 mL into the appropriate muscle as directed by prescriber Every 30 (Thirty) Days., Disp: 1 mL, Rfl: 2    estradiol (ESTRACE) 0.1 MG/GM vaginal cream, Insert 2 g into the vagina 3 (Three) Times a Week., Disp: , Rfl:     estradiol (Evamist) 1.53 MG/SPRAY transdermal spray, Place 1 spray on the skin as directed by provider Daily., Disp: , Rfl:     eszopiclone (Lunesta) 2 MG tablet, Take 1 tablet by mouth Every Night. Take immediately before bedtime (Patient taking differently: Take 1 tablet by mouth As Needed. Take immediately before bedtime), Disp: 30 tablet, Rfl: 2    montelukast (SINGULAIR) 10 MG tablet, TAKE ONE TABLET BY MOUTH ONCE NIGHTLY (Patient taking differently: Take 1 tablet by mouth As Needed.), Disp: 90 tablet, Rfl: 3    NON FORMULARY, Daily. Green drinks with numerous vitamins and probiotics, anti oxidants, Disp: , Rfl:     Progesterone (PROMETRIUM) 100 MG capsule, Take 1 capsule by mouth Every Night., Disp: , Rfl:     pyridostigmine (MESTINON) 60 MG tablet, Take 0.5 tablets by mouth 4 (Four) Times a Day., Disp: 60 tablet, Rfl: 11    Rimegepant Sulfate (Nurtec) 75 MG tablet dispersible tablet, PLACE 1 TABLET BY MOUTH DAILY AS NEEDED FOR MIGRAINE, Disp: 16 tablet, Rfl: 11    SudoGest 30 MG tablet, TAKE ONE TABLET BY MOUTH EVERY 6 HOURS AS NEEDED FOR CONGESTION, Disp: 24 tablet, Rfl: 6    triamcinolone (KENALOG) 0.1 % cream, Apply  topically to the appropriate area as directed As Needed., Disp: , Rfl:     Assessment & Plan   Diagnoses and all orders for this visit:    1. LUNDY (dyspnea on exertion) (Primary)  -     CBC & Differential  -     Comprehensive Metabolic Panel  -     CK  -     Sedimentation Rate  -     C-reactive protein  -     XR Chest PA & Lateral  -If workup above is negative, will get CT high-res  -Consider  occult pneumonia, lung/mediastinal mass, lung disease    2. Other fatigue  -     CBC & Differential  -     Comprehensive Metabolic Panel  -     CK  -     Sedimentation Rate  -     C-reactive protein             Jennifer Abrams MD      Answers submitted by the patient for this visit:  Shortness of Breath Questionnaire (Submitted on 3/14/2025)  Chief Complaint: Shortness of breath  Chronicity: chronic  Onset: more than 1 month ago  Frequency: intermittently  Progression since onset: coming and going  Fever: no fever  chest congestion: No  chest pain/tightness: No  hemoptysis: No  sputum production: No  PND: No  syncope: No  fever: No  headaches: No  dry cough: Yes  leg pain: No  leg swelling: No  orthopnea: No  sore throat: No  nasal congestion: Yes  swollen glands: No  vomiting: No  wheezing: No  Aggravating factors: exercise  asthma: No  COPD: No  Recent oxygen %: 95% 02 sat  Additional Information: Fatigue, noticeable muscle weakness about 5 years -big decrease past month -  has even commented. SOB with excercise past 2 weeks. Having off and on with stair use past 3 months. Had sedation 2 weeks ago and SOB after getting home no exercise

## 2025-03-21 LAB
ALBUMIN SERPL-MCNC: 4.3 G/DL (ref 3.5–5.2)
ALBUMIN/GLOB SERPL: 1.7 G/DL
ALP SERPL-CCNC: 60 U/L (ref 39–117)
ALT SERPL W P-5'-P-CCNC: 18 U/L (ref 1–33)
ANION GAP SERPL CALCULATED.3IONS-SCNC: 9 MMOL/L (ref 5–15)
AST SERPL-CCNC: 28 U/L (ref 1–32)
BILIRUB SERPL-MCNC: 0.4 MG/DL (ref 0–1.2)
BUN SERPL-MCNC: 15 MG/DL (ref 8–23)
BUN/CREAT SERPL: 17.4 (ref 7–25)
CALCIUM SPEC-SCNC: 9.2 MG/DL (ref 8.6–10.5)
CHLORIDE SERPL-SCNC: 103 MMOL/L (ref 98–107)
CK SERPL-CCNC: 106 U/L (ref 20–180)
CO2 SERPL-SCNC: 27 MMOL/L (ref 22–29)
CREAT SERPL-MCNC: 0.86 MG/DL (ref 0.57–1)
CRP SERPL-MCNC: <0.3 MG/DL (ref 0–0.5)
EGFRCR SERPLBLD CKD-EPI 2021: 77 ML/MIN/1.73
GLOBULIN UR ELPH-MCNC: 2.5 GM/DL
GLUCOSE SERPL-MCNC: 103 MG/DL (ref 65–99)
POTASSIUM SERPL-SCNC: 4.4 MMOL/L (ref 3.5–5.2)
PROT SERPL-MCNC: 6.8 G/DL (ref 6–8.5)
SODIUM SERPL-SCNC: 139 MMOL/L (ref 136–145)

## 2025-04-14 DIAGNOSIS — R06.09 DOE (DYSPNEA ON EXERTION): Primary | ICD-10-CM

## 2025-04-14 DIAGNOSIS — R09.02 HYPOXIA: ICD-10-CM

## 2025-04-14 DIAGNOSIS — R06.02 SHORTNESS OF BREATH: ICD-10-CM

## 2025-04-23 ENCOUNTER — TELEPHONE (OUTPATIENT)
Dept: INTERNAL MEDICINE | Facility: CLINIC | Age: 60
End: 2025-04-23
Payer: COMMERCIAL

## 2025-04-23 NOTE — TELEPHONE ENCOUNTER
----- Message from Jennifer Abrams sent at 4/22/2025  4:08 PM EDT -----  I ordered a CT high-res on this patient on 4/14.  Can you look into it?  I just figured it would be scheduled by now. Concerned over the possible diagnosis it may yield.

## 2025-04-23 NOTE — TELEPHONE ENCOUNTER
Called [facility] to see id they received the fax, but they said they do not have it. I confirmed their fax with their office and faxed it to [phone], madelyn

## 2025-04-25 ENCOUNTER — TELEPHONE (OUTPATIENT)
Dept: INTERNAL MEDICINE | Facility: CLINIC | Age: 60
End: 2025-04-25
Payer: COMMERCIAL

## 2025-04-25 NOTE — TELEPHONE ENCOUNTER
[facility] called to get auth for the pts CT hi chest res. I went to [facility] to obtain auth, but it is currently under review jamie and said the results should be back by 4/2/2025 and the Order ID: [id number]. Will check back Monday morning.

## (undated) DEVICE — PK CYSTO-TUR BASIC 10

## (undated) DEVICE — GLV SURG SENSICARE PI MIC PF SZ7.5 LF STRL

## (undated) DEVICE — NITINOL WIRE WITH HYDROPHILIC TIP: Brand: SENSOR

## (undated) DEVICE — NITINOL STONE RETRIEVAL BASKET: Brand: ZERO TIP

## (undated) DEVICE — CATH URETRL FLXITP POLLACK STD 5F 70CM

## (undated) DEVICE — GLV SURG SENSICARE MICRO PF LF 7.5 STRL